# Patient Record
Sex: MALE | Race: WHITE | NOT HISPANIC OR LATINO | URBAN - METROPOLITAN AREA
[De-identification: names, ages, dates, MRNs, and addresses within clinical notes are randomized per-mention and may not be internally consistent; named-entity substitution may affect disease eponyms.]

---

## 2021-01-23 ENCOUNTER — INPATIENT (INPATIENT)
Facility: HOSPITAL | Age: 66
LOS: 2 days | Discharge: NURSING, REHAB OR LONG-TERM CARE | DRG: 191 | End: 2021-01-26
Attending: INTERNAL MEDICINE | Admitting: INTERNAL MEDICINE
Payer: MEDICAID

## 2021-01-23 VITALS
RESPIRATION RATE: 16 BRPM | WEIGHT: 203.93 LBS | HEART RATE: 80 BPM | HEIGHT: 72 IN | SYSTOLIC BLOOD PRESSURE: 74 MMHG | TEMPERATURE: 94 F | DIASTOLIC BLOOD PRESSURE: 45 MMHG | OXYGEN SATURATION: 90 %

## 2021-01-23 LAB
ALBUMIN SERPL ELPH-MCNC: 3.2 G/DL — LOW (ref 3.5–5)
ALP SERPL-CCNC: 87 U/L — SIGNIFICANT CHANGE UP (ref 40–120)
ALT FLD-CCNC: 21 U/L DA — SIGNIFICANT CHANGE UP (ref 10–60)
ANION GAP SERPL CALC-SCNC: 8 MMOL/L — SIGNIFICANT CHANGE UP (ref 5–17)
AST SERPL-CCNC: 14 U/L — SIGNIFICANT CHANGE UP (ref 10–40)
BASOPHILS # BLD AUTO: 0.02 K/UL — SIGNIFICANT CHANGE UP (ref 0–0.2)
BASOPHILS NFR BLD AUTO: 0.2 % — SIGNIFICANT CHANGE UP (ref 0–2)
BILIRUB SERPL-MCNC: 0.5 MG/DL — SIGNIFICANT CHANGE UP (ref 0.2–1.2)
BUN SERPL-MCNC: 15 MG/DL — SIGNIFICANT CHANGE UP (ref 7–18)
CALCIUM SERPL-MCNC: 8.1 MG/DL — LOW (ref 8.4–10.5)
CHLORIDE SERPL-SCNC: 102 MMOL/L — SIGNIFICANT CHANGE UP (ref 96–108)
CO2 SERPL-SCNC: 27 MMOL/L — SIGNIFICANT CHANGE UP (ref 22–31)
CREAT SERPL-MCNC: 1.75 MG/DL — HIGH (ref 0.5–1.3)
EOSINOPHIL # BLD AUTO: 0.01 K/UL — SIGNIFICANT CHANGE UP (ref 0–0.5)
EOSINOPHIL NFR BLD AUTO: 0.1 % — SIGNIFICANT CHANGE UP (ref 0–6)
GLUCOSE SERPL-MCNC: 132 MG/DL — HIGH (ref 70–99)
HCT VFR BLD CALC: 40 % — SIGNIFICANT CHANGE UP (ref 39–50)
HGB BLD-MCNC: 12.9 G/DL — LOW (ref 13–17)
IMM GRANULOCYTES NFR BLD AUTO: 0.9 % — SIGNIFICANT CHANGE UP (ref 0–1.5)
LYMPHOCYTES # BLD AUTO: 1.03 K/UL — SIGNIFICANT CHANGE UP (ref 1–3.3)
LYMPHOCYTES # BLD AUTO: 12.7 % — LOW (ref 13–44)
MCHC RBC-ENTMCNC: 32.3 GM/DL — SIGNIFICANT CHANGE UP (ref 32–36)
MCHC RBC-ENTMCNC: 33.9 PG — SIGNIFICANT CHANGE UP (ref 27–34)
MCV RBC AUTO: 105.3 FL — HIGH (ref 80–100)
MONOCYTES # BLD AUTO: 0.87 K/UL — SIGNIFICANT CHANGE UP (ref 0–0.9)
MONOCYTES NFR BLD AUTO: 10.7 % — SIGNIFICANT CHANGE UP (ref 2–14)
NEUTROPHILS # BLD AUTO: 6.11 K/UL — SIGNIFICANT CHANGE UP (ref 1.8–7.4)
NEUTROPHILS NFR BLD AUTO: 75.4 % — SIGNIFICANT CHANGE UP (ref 43–77)
NRBC # BLD: 0 /100 WBCS — SIGNIFICANT CHANGE UP (ref 0–0)
PLATELET # BLD AUTO: 262 K/UL — SIGNIFICANT CHANGE UP (ref 150–400)
POTASSIUM SERPL-MCNC: 3.7 MMOL/L — SIGNIFICANT CHANGE UP (ref 3.5–5.3)
POTASSIUM SERPL-SCNC: 3.7 MMOL/L — SIGNIFICANT CHANGE UP (ref 3.5–5.3)
PROT SERPL-MCNC: 6.8 G/DL — SIGNIFICANT CHANGE UP (ref 6–8.3)
RBC # BLD: 3.8 M/UL — LOW (ref 4.2–5.8)
RBC # FLD: 13.3 % — SIGNIFICANT CHANGE UP (ref 10.3–14.5)
SARS-COV-2 RNA SPEC QL NAA+PROBE: SIGNIFICANT CHANGE UP
SODIUM SERPL-SCNC: 137 MMOL/L — SIGNIFICANT CHANGE UP (ref 135–145)
TROPONIN I SERPL-MCNC: <0.015 NG/ML — SIGNIFICANT CHANGE UP (ref 0–0.04)
WBC # BLD: 8.11 K/UL — SIGNIFICANT CHANGE UP (ref 3.8–10.5)
WBC # FLD AUTO: 8.11 K/UL — SIGNIFICANT CHANGE UP (ref 3.8–10.5)

## 2021-01-23 PROCEDURE — 71045 X-RAY EXAM CHEST 1 VIEW: CPT | Mod: 26

## 2021-01-23 RX ORDER — SODIUM CHLORIDE 9 MG/ML
1000 INJECTION INTRAMUSCULAR; INTRAVENOUS; SUBCUTANEOUS ONCE
Refills: 0 | Status: COMPLETED | OUTPATIENT
Start: 2021-01-23 | End: 2021-01-23

## 2021-01-23 RX ORDER — MAGNESIUM SULFATE 500 MG/ML
1 VIAL (ML) INJECTION ONCE
Refills: 0 | Status: COMPLETED | OUTPATIENT
Start: 2021-01-23 | End: 2021-01-23

## 2021-01-23 RX ORDER — IPRATROPIUM/ALBUTEROL SULFATE 18-103MCG
3 AEROSOL WITH ADAPTER (GRAM) INHALATION ONCE
Refills: 0 | Status: COMPLETED | OUTPATIENT
Start: 2021-01-23 | End: 2021-01-23

## 2021-01-23 RX ADMIN — Medication 3 MILLILITER(S): at 23:19

## 2021-01-23 RX ADMIN — Medication 3 MILLILITER(S): at 23:29

## 2021-01-23 RX ADMIN — Medication 125 MILLIGRAM(S): at 23:10

## 2021-01-23 RX ADMIN — SODIUM CHLORIDE 1000 MILLILITER(S): 9 INJECTION INTRAMUSCULAR; INTRAVENOUS; SUBCUTANEOUS at 23:11

## 2021-01-23 RX ADMIN — Medication 100 GRAM(S): at 23:10

## 2021-01-23 NOTE — ED PROVIDER NOTE - OBJECTIVE STATEMENT
64 y/o male with PMHx of COPD, DM, BPH, hypothyroid. anxiety, and depression presents with complaint of shortness of breath. As per patient he got the corona virus vaccine 2 day ago and has been feeling short of breath with subjective fever since.  Symptoms worsened today.  As per patient he was going to have a cigarette when his symptoms worsened.  pt thought he was going to pass out.  In ED patient found to be hypotensive to 74/45, temperature of 94 and sat of 90%.  pt speaking 4-5 word sentences.

## 2021-01-23 NOTE — ED PROVIDER NOTE - PMH
Anxiety    Calculus of Kidney    Depression    Tinea Versicolor    UTI (Urinary Tract Infection)

## 2021-01-23 NOTE — ED PROVIDER NOTE - CLINICAL SUMMARY MEDICAL DECISION MAKING FREE TEXT BOX
pt with history of COPD Presents with shortness of breath.  + wheezing on exam.  Pt s/p covid vaccine 2 days ago.  Will check labs, nebs, mag sulfate, steroids, and reassess.

## 2021-01-23 NOTE — ED ADULT TRIAGE NOTE - CHIEF COMPLAINT QUOTE
BIBA states he feels sick since getting covid vaccine 2 days ago, pt was covid+ last april, pt hypotensive, hypothermic and 90% O2sat, h/o COPD

## 2021-01-24 DIAGNOSIS — F17.200 NICOTINE DEPENDENCE, UNSPECIFIED, UNCOMPLICATED: ICD-10-CM

## 2021-01-24 DIAGNOSIS — R52 PAIN, UNSPECIFIED: ICD-10-CM

## 2021-01-24 DIAGNOSIS — R21 RASH AND OTHER NONSPECIFIC SKIN ERUPTION: ICD-10-CM

## 2021-01-24 DIAGNOSIS — Z29.9 ENCOUNTER FOR PROPHYLACTIC MEASURES, UNSPECIFIED: ICD-10-CM

## 2021-01-24 DIAGNOSIS — J44.1 CHRONIC OBSTRUCTIVE PULMONARY DISEASE WITH (ACUTE) EXACERBATION: ICD-10-CM

## 2021-01-24 DIAGNOSIS — R55 SYNCOPE AND COLLAPSE: ICD-10-CM

## 2021-01-24 DIAGNOSIS — N40.0 BENIGN PROSTATIC HYPERPLASIA WITHOUT LOWER URINARY TRACT SYMPTOMS: ICD-10-CM

## 2021-01-24 DIAGNOSIS — F41.9 ANXIETY DISORDER, UNSPECIFIED: ICD-10-CM

## 2021-01-24 DIAGNOSIS — N17.9 ACUTE KIDNEY FAILURE, UNSPECIFIED: ICD-10-CM

## 2021-01-24 LAB
24R-OH-CALCIDIOL SERPL-MCNC: 26.1 NG/ML — LOW (ref 30–80)
A1C WITH ESTIMATED AVERAGE GLUCOSE RESULT: 6 % — HIGH (ref 4–5.6)
ANION GAP SERPL CALC-SCNC: 10 MMOL/L — SIGNIFICANT CHANGE UP (ref 5–17)
APPEARANCE UR: CLEAR — SIGNIFICANT CHANGE UP
BACTERIA # UR AUTO: ABNORMAL /HPF
BASOPHILS # BLD AUTO: 0 K/UL — SIGNIFICANT CHANGE UP (ref 0–0.2)
BASOPHILS NFR BLD AUTO: 0 % — SIGNIFICANT CHANGE UP (ref 0–2)
BILIRUB UR-MCNC: NEGATIVE — SIGNIFICANT CHANGE UP
BUN SERPL-MCNC: 18 MG/DL — SIGNIFICANT CHANGE UP (ref 7–18)
BURR CELLS BLD QL SMEAR: PRESENT — SIGNIFICANT CHANGE UP
BURR CELLS BLD QL SMEAR: SLIGHT — SIGNIFICANT CHANGE UP
CALCIUM SERPL-MCNC: 8.4 MG/DL — SIGNIFICANT CHANGE UP (ref 8.4–10.5)
CHLORIDE SERPL-SCNC: 104 MMOL/L — SIGNIFICANT CHANGE UP (ref 96–108)
CHOLEST SERPL-MCNC: 180 MG/DL — SIGNIFICANT CHANGE UP
CO2 SERPL-SCNC: 24 MMOL/L — SIGNIFICANT CHANGE UP (ref 22–31)
COLOR SPEC: YELLOW — SIGNIFICANT CHANGE UP
COMMENT - URINE: SIGNIFICANT CHANGE UP
CREAT ?TM UR-MCNC: 157 MG/DL — SIGNIFICANT CHANGE UP
CREAT SERPL-MCNC: 1.83 MG/DL — HIGH (ref 0.5–1.3)
DIFF PNL FLD: ABNORMAL
EOSINOPHIL # BLD AUTO: 0.19 K/UL — SIGNIFICANT CHANGE UP (ref 0–0.5)
EOSINOPHIL NFR BLD AUTO: 3.2 % — SIGNIFICANT CHANGE UP (ref 0–6)
EPI CELLS # UR: SIGNIFICANT CHANGE UP /HPF
ESTIMATED AVERAGE GLUCOSE: 126 MG/DL — HIGH (ref 68–114)
FERRITIN SERPL-MCNC: 154 NG/ML — SIGNIFICANT CHANGE UP (ref 30–400)
FOLATE SERPL-MCNC: >20 NG/ML — SIGNIFICANT CHANGE UP
GLUCOSE SERPL-MCNC: 231 MG/DL — HIGH (ref 70–99)
GLUCOSE UR QL: 250
HCT VFR BLD CALC: 40.8 % — SIGNIFICANT CHANGE UP (ref 39–50)
HCYS SERPL-MCNC: 9.6 UMOL/L — SIGNIFICANT CHANGE UP
HDLC SERPL-MCNC: 65 MG/DL — SIGNIFICANT CHANGE UP
HGB BLD-MCNC: 13.3 G/DL — SIGNIFICANT CHANGE UP (ref 13–17)
IMM GRANULOCYTES NFR BLD AUTO: 0.8 % — SIGNIFICANT CHANGE UP (ref 0–1.5)
IRON SATN MFR SERPL: 21 UG/DL — LOW (ref 65–170)
IRON SATN MFR SERPL: 7 % — LOW (ref 20–55)
KETONES UR-MCNC: NEGATIVE — SIGNIFICANT CHANGE UP
LDH SERPL L TO P-CCNC: 184 U/L — SIGNIFICANT CHANGE UP (ref 120–225)
LEUKOCYTE ESTERASE UR-ACNC: NEGATIVE — SIGNIFICANT CHANGE UP
LG PLATELETS BLD QL AUTO: SLIGHT — SIGNIFICANT CHANGE UP
LIPID PNL WITH DIRECT LDL SERPL: 93 MG/DL — SIGNIFICANT CHANGE UP
LYMPHOCYTES # BLD AUTO: 0.46 K/UL — LOW (ref 1–3.3)
LYMPHOCYTES # BLD AUTO: 7.7 % — LOW (ref 13–44)
MACROCYTES BLD QL: SLIGHT — SIGNIFICANT CHANGE UP
MAGNESIUM SERPL-MCNC: 2.2 MG/DL — SIGNIFICANT CHANGE UP (ref 1.6–2.6)
MANUAL SMEAR VERIFICATION: SIGNIFICANT CHANGE UP
MCHC RBC-ENTMCNC: 32.6 GM/DL — SIGNIFICANT CHANGE UP (ref 32–36)
MCHC RBC-ENTMCNC: 34.4 PG — HIGH (ref 27–34)
MCV RBC AUTO: 105.4 FL — HIGH (ref 80–100)
MONOCYTES # BLD AUTO: 0.05 K/UL — SIGNIFICANT CHANGE UP (ref 0–0.9)
MONOCYTES NFR BLD AUTO: 0.8 % — LOW (ref 2–14)
NEUTROPHILS # BLD AUTO: 5.23 K/UL — SIGNIFICANT CHANGE UP (ref 1.8–7.4)
NEUTROPHILS NFR BLD AUTO: 87.5 % — HIGH (ref 43–77)
NITRITE UR-MCNC: NEGATIVE — SIGNIFICANT CHANGE UP
NON HDL CHOLESTEROL: 115 MG/DL — SIGNIFICANT CHANGE UP
NRBC # BLD: 0 /100 WBCS — SIGNIFICANT CHANGE UP (ref 0–0)
OVALOCYTES BLD QL SMEAR: SLIGHT — SIGNIFICANT CHANGE UP
PH UR: 6 — SIGNIFICANT CHANGE UP (ref 5–8)
PHOSPHATE SERPL-MCNC: 2.6 MG/DL — SIGNIFICANT CHANGE UP (ref 2.5–4.5)
PLAT MORPH BLD: NORMAL — SIGNIFICANT CHANGE UP
PLATELET # BLD AUTO: 284 K/UL — SIGNIFICANT CHANGE UP (ref 150–400)
PLATELET COUNT - ESTIMATE: NORMAL — SIGNIFICANT CHANGE UP
POIKILOCYTOSIS BLD QL AUTO: SLIGHT — SIGNIFICANT CHANGE UP
POTASSIUM SERPL-MCNC: 4.3 MMOL/L — SIGNIFICANT CHANGE UP (ref 3.5–5.3)
POTASSIUM SERPL-SCNC: 4.3 MMOL/L — SIGNIFICANT CHANGE UP (ref 3.5–5.3)
PROT ?TM UR-MCNC: 26 MG/DL — HIGH (ref 0–12)
PROT SERPL-MCNC: 6.6 G/DL — SIGNIFICANT CHANGE UP (ref 6–8.3)
PROT SERPL-MCNC: 6.6 G/DL — SIGNIFICANT CHANGE UP (ref 6–8.3)
PROT UR-MCNC: 15
RBC # BLD: 3.87 M/UL — LOW (ref 4.2–5.8)
RBC # BLD: 3.87 M/UL — LOW (ref 4.2–5.8)
RBC # FLD: 13.6 % — SIGNIFICANT CHANGE UP (ref 10.3–14.5)
RBC BLD AUTO: ABNORMAL
RBC CASTS # UR COMP ASSIST: ABNORMAL /HPF (ref 0–2)
RETICS #: 84 K/UL — SIGNIFICANT CHANGE UP (ref 25–125)
RETICS/RBC NFR: 2.2 % — SIGNIFICANT CHANGE UP (ref 0.5–2.5)
SARS-COV-2 IGG SERPL QL IA: POSITIVE
SARS-COV-2 IGM SERPL IA-ACNC: 183 INDEX — HIGH
SODIUM SERPL-SCNC: 138 MMOL/L — SIGNIFICANT CHANGE UP (ref 135–145)
SODIUM UR-SCNC: 56 MMOL/L — SIGNIFICANT CHANGE UP
SP GR SPEC: 1.02 — SIGNIFICANT CHANGE UP (ref 1.01–1.02)
TIBC SERPL-MCNC: 291 UG/DL — SIGNIFICANT CHANGE UP (ref 250–450)
TRIGL SERPL-MCNC: 110 MG/DL — SIGNIFICANT CHANGE UP
TROPONIN I SERPL-MCNC: <0.015 NG/ML — SIGNIFICANT CHANGE UP (ref 0–0.04)
TSH SERPL-MCNC: 0.35 UU/ML — SIGNIFICANT CHANGE UP (ref 0.34–4.82)
UIBC SERPL-MCNC: 270 UG/DL — SIGNIFICANT CHANGE UP (ref 110–370)
UROBILINOGEN FLD QL: NEGATIVE — SIGNIFICANT CHANGE UP
VIT B12 SERPL-MCNC: 375 PG/ML — SIGNIFICANT CHANGE UP (ref 232–1245)
WBC # BLD: 5.98 K/UL — SIGNIFICANT CHANGE UP (ref 3.8–10.5)
WBC # FLD AUTO: 5.98 K/UL — SIGNIFICANT CHANGE UP (ref 3.8–10.5)
WBC UR QL: SIGNIFICANT CHANGE UP /HPF (ref 0–5)

## 2021-01-24 PROCEDURE — 70450 CT HEAD/BRAIN W/O DYE: CPT | Mod: 26

## 2021-01-24 PROCEDURE — 93010 ELECTROCARDIOGRAM REPORT: CPT

## 2021-01-24 PROCEDURE — 99285 EMERGENCY DEPT VISIT HI MDM: CPT

## 2021-01-24 PROCEDURE — 93880 EXTRACRANIAL BILAT STUDY: CPT | Mod: 26

## 2021-01-24 RX ORDER — OXYCODONE HYDROCHLORIDE 5 MG/1
15 TABLET ORAL THREE TIMES A DAY
Refills: 0 | Status: DISCONTINUED | OUTPATIENT
Start: 2021-01-24 | End: 2021-01-25

## 2021-01-24 RX ORDER — SODIUM CHLORIDE 9 MG/ML
1000 INJECTION INTRAMUSCULAR; INTRAVENOUS; SUBCUTANEOUS
Refills: 0 | Status: DISCONTINUED | OUTPATIENT
Start: 2021-01-24 | End: 2021-01-26

## 2021-01-24 RX ORDER — CHOLECALCIFEROL (VITAMIN D3) 125 MCG
1000 CAPSULE ORAL DAILY
Refills: 0 | Status: DISCONTINUED | OUTPATIENT
Start: 2021-01-24 | End: 2021-01-26

## 2021-01-24 RX ORDER — LIDOCAINE 4 G/100G
15 CREAM TOPICAL
Refills: 0 | Status: DISCONTINUED | OUTPATIENT
Start: 2021-01-24 | End: 2021-01-26

## 2021-01-24 RX ORDER — ALBUTEROL 90 UG/1
2 AEROSOL, METERED ORAL EVERY 6 HOURS
Refills: 0 | Status: DISCONTINUED | OUTPATIENT
Start: 2021-01-24 | End: 2021-01-26

## 2021-01-24 RX ORDER — OXCARBAZEPINE 300 MG/1
300 TABLET, FILM COATED ORAL
Refills: 0 | Status: DISCONTINUED | OUTPATIENT
Start: 2021-01-24 | End: 2021-01-26

## 2021-01-24 RX ORDER — FOLIC ACID 0.8 MG
1 TABLET ORAL DAILY
Refills: 0 | Status: DISCONTINUED | OUTPATIENT
Start: 2021-01-24 | End: 2021-01-26

## 2021-01-24 RX ORDER — HEPARIN SODIUM 5000 [USP'U]/ML
5000 INJECTION INTRAVENOUS; SUBCUTANEOUS EVERY 8 HOURS
Refills: 0 | Status: DISCONTINUED | OUTPATIENT
Start: 2021-01-24 | End: 2021-01-26

## 2021-01-24 RX ORDER — GABAPENTIN 400 MG/1
600 CAPSULE ORAL THREE TIMES A DAY
Refills: 0 | Status: DISCONTINUED | OUTPATIENT
Start: 2021-01-24 | End: 2021-01-26

## 2021-01-24 RX ORDER — PANTOPRAZOLE SODIUM 20 MG/1
40 TABLET, DELAYED RELEASE ORAL
Refills: 0 | Status: DISCONTINUED | OUTPATIENT
Start: 2021-01-24 | End: 2021-01-26

## 2021-01-24 RX ORDER — BUDESONIDE AND FORMOTEROL FUMARATE DIHYDRATE 160; 4.5 UG/1; UG/1
2 AEROSOL RESPIRATORY (INHALATION)
Refills: 0 | Status: DISCONTINUED | OUTPATIENT
Start: 2021-01-24 | End: 2021-01-26

## 2021-01-24 RX ORDER — TAMSULOSIN HYDROCHLORIDE 0.4 MG/1
0.4 CAPSULE ORAL AT BEDTIME
Refills: 0 | Status: DISCONTINUED | OUTPATIENT
Start: 2021-01-24 | End: 2021-01-26

## 2021-01-24 RX ORDER — QUETIAPINE FUMARATE 200 MG/1
100 TABLET, FILM COATED ORAL AT BEDTIME
Refills: 0 | Status: DISCONTINUED | OUTPATIENT
Start: 2021-01-24 | End: 2021-01-26

## 2021-01-24 RX ORDER — DULOXETINE HYDROCHLORIDE 30 MG/1
30 CAPSULE, DELAYED RELEASE ORAL
Refills: 0 | Status: DISCONTINUED | OUTPATIENT
Start: 2021-01-24 | End: 2021-01-26

## 2021-01-24 RX ORDER — SODIUM CHLORIDE 9 MG/ML
1000 INJECTION INTRAMUSCULAR; INTRAVENOUS; SUBCUTANEOUS ONCE
Refills: 0 | Status: COMPLETED | OUTPATIENT
Start: 2021-01-24 | End: 2021-01-24

## 2021-01-24 RX ORDER — ASPIRIN/CALCIUM CARB/MAGNESIUM 324 MG
81 TABLET ORAL DAILY
Refills: 0 | Status: DISCONTINUED | OUTPATIENT
Start: 2021-01-24 | End: 2021-01-26

## 2021-01-24 RX ORDER — LEVOTHYROXINE SODIUM 125 MCG
25 TABLET ORAL
Refills: 0 | Status: DISCONTINUED | OUTPATIENT
Start: 2021-01-24 | End: 2021-01-26

## 2021-01-24 RX ORDER — OXYCODONE HYDROCHLORIDE 5 MG/1
10 TABLET ORAL ONCE
Refills: 0 | Status: DISCONTINUED | OUTPATIENT
Start: 2021-01-24 | End: 2021-01-24

## 2021-01-24 RX ORDER — ATORVASTATIN CALCIUM 80 MG/1
20 TABLET, FILM COATED ORAL AT BEDTIME
Refills: 0 | Status: DISCONTINUED | OUTPATIENT
Start: 2021-01-24 | End: 2021-01-26

## 2021-01-24 RX ORDER — OXYCODONE HYDROCHLORIDE 5 MG/1
5 TABLET ORAL THREE TIMES A DAY
Refills: 0 | Status: DISCONTINUED | OUTPATIENT
Start: 2021-01-24 | End: 2021-01-24

## 2021-01-24 RX ORDER — NICOTINE POLACRILEX 2 MG
1 GUM BUCCAL DAILY
Refills: 0 | Status: DISCONTINUED | OUTPATIENT
Start: 2021-01-24 | End: 2021-01-26

## 2021-01-24 RX ADMIN — ATORVASTATIN CALCIUM 20 MILLIGRAM(S): 80 TABLET, FILM COATED ORAL at 21:41

## 2021-01-24 RX ADMIN — OXYCODONE HYDROCHLORIDE 5 MILLIGRAM(S): 5 TABLET ORAL at 13:42

## 2021-01-24 RX ADMIN — Medication 40 MILLIGRAM(S): at 05:36

## 2021-01-24 RX ADMIN — GABAPENTIN 600 MILLIGRAM(S): 400 CAPSULE ORAL at 05:37

## 2021-01-24 RX ADMIN — Medication 1 PATCH: at 19:52

## 2021-01-24 RX ADMIN — SODIUM CHLORIDE 50 MILLILITER(S): 9 INJECTION INTRAMUSCULAR; INTRAVENOUS; SUBCUTANEOUS at 14:36

## 2021-01-24 RX ADMIN — Medication 25 MICROGRAM(S): at 05:37

## 2021-01-24 RX ADMIN — Medication 40 MILLIGRAM(S): at 17:35

## 2021-01-24 RX ADMIN — Medication 1 APPLICATION(S): at 05:39

## 2021-01-24 RX ADMIN — HEPARIN SODIUM 5000 UNIT(S): 5000 INJECTION INTRAVENOUS; SUBCUTANEOUS at 21:41

## 2021-01-24 RX ADMIN — GABAPENTIN 600 MILLIGRAM(S): 400 CAPSULE ORAL at 21:41

## 2021-01-24 RX ADMIN — PANTOPRAZOLE SODIUM 40 MILLIGRAM(S): 20 TABLET, DELAYED RELEASE ORAL at 05:37

## 2021-01-24 RX ADMIN — HEPARIN SODIUM 5000 UNIT(S): 5000 INJECTION INTRAVENOUS; SUBCUTANEOUS at 05:36

## 2021-01-24 RX ADMIN — OXYCODONE HYDROCHLORIDE 5 MILLIGRAM(S): 5 TABLET ORAL at 05:37

## 2021-01-24 RX ADMIN — BUDESONIDE AND FORMOTEROL FUMARATE DIHYDRATE 2 PUFF(S): 160; 4.5 AEROSOL RESPIRATORY (INHALATION) at 12:11

## 2021-01-24 RX ADMIN — Medication 1 PATCH: at 12:10

## 2021-01-24 RX ADMIN — HEPARIN SODIUM 5000 UNIT(S): 5000 INJECTION INTRAVENOUS; SUBCUTANEOUS at 13:42

## 2021-01-24 RX ADMIN — DULOXETINE HYDROCHLORIDE 30 MILLIGRAM(S): 30 CAPSULE, DELAYED RELEASE ORAL at 05:37

## 2021-01-24 RX ADMIN — OXCARBAZEPINE 300 MILLIGRAM(S): 300 TABLET, FILM COATED ORAL at 17:31

## 2021-01-24 RX ADMIN — QUETIAPINE FUMARATE 100 MILLIGRAM(S): 200 TABLET, FILM COATED ORAL at 21:41

## 2021-01-24 RX ADMIN — BUDESONIDE AND FORMOTEROL FUMARATE DIHYDRATE 2 PUFF(S): 160; 4.5 AEROSOL RESPIRATORY (INHALATION) at 22:59

## 2021-01-24 RX ADMIN — Medication 1 MILLIGRAM(S): at 12:10

## 2021-01-24 RX ADMIN — OXYCODONE HYDROCHLORIDE 15 MILLIGRAM(S): 5 TABLET ORAL at 21:47

## 2021-01-24 RX ADMIN — TAMSULOSIN HYDROCHLORIDE 0.4 MILLIGRAM(S): 0.4 CAPSULE ORAL at 21:41

## 2021-01-24 RX ADMIN — DULOXETINE HYDROCHLORIDE 30 MILLIGRAM(S): 30 CAPSULE, DELAYED RELEASE ORAL at 17:31

## 2021-01-24 RX ADMIN — Medication 81 MILLIGRAM(S): at 12:10

## 2021-01-24 RX ADMIN — OXCARBAZEPINE 300 MILLIGRAM(S): 300 TABLET, FILM COATED ORAL at 05:37

## 2021-01-24 RX ADMIN — Medication 1 GRAM(S): at 00:23

## 2021-01-24 RX ADMIN — OXYCODONE HYDROCHLORIDE 10 MILLIGRAM(S): 5 TABLET ORAL at 14:34

## 2021-01-24 RX ADMIN — GABAPENTIN 600 MILLIGRAM(S): 400 CAPSULE ORAL at 13:41

## 2021-01-24 RX ADMIN — SODIUM CHLORIDE 1000 MILLILITER(S): 9 INJECTION INTRAMUSCULAR; INTRAVENOUS; SUBCUTANEOUS at 00:19

## 2021-01-24 RX ADMIN — Medication 1 APPLICATION(S): at 17:32

## 2021-01-24 NOTE — H&P ADULT - ASSESSMENT
64 y/o male from [facility], walks with walker, with PMHx of COPD, orthostatic hypotension, BPH, hypothyroid. anxiety, and depression, PSH of left hip pin fixation presents with complaint of weakness and shortness of breath. Pt was admitted for COPD exacerbation and syncope work up.       In the ED, patient  was found to be hypotensive to 74/45, temperature of 94 and sat of 90%.  pt speaking 4-5 word sentences.  s/p duoneb, solu-medrol 125mg    GOC: FULL CODE

## 2021-01-24 NOTE — H&P ADULT - PROBLEM SELECTOR PLAN 2
p/w SOB, spO2 was 90%  s/p Solu-medrol and duonebs  Currently on RA, no respiratory distress, however wheezing all over the lungs  c/w Solu-medrol 40mg IV BID  c/w inhalers  monitor respiratory status

## 2021-01-24 NOTE — CONSULT NOTE ADULT - SUBJECTIVE AND OBJECTIVE BOX
PULMONARY CONSULT NOTE      MARNIE FRY  MRN-[id number]    Patient is a 65y old  Male who presents with a chief complaint of Syncope and COPD exacerbation (2021 03:13)    History of Present Illness:  Reason for Admission: Syncope and COPD exacerbation  History of Present Illness:   64 y/o male from [facility], walks with walker, with PMHx of COPD, orthostatic hypotension, BPH, hypothyroid. anxiety, and depression, PSH of left hip pin fixation presents with complaint of weakness and shortness of breath. As per patient he had the covid infection previously, and had covid vaccine on Fri (2 days ago) and has been feeling short of breath and sick since.  As per patient,  he was walking with a walker, suddenly he felt dizzy and weak and laid down on the floor. He thinks he lost consciousness at some point. He heard that people was trying to wake him up, but he was too weak to respond. As per pt, he always have orthostatic hypotension, usually his SBP is 120s, and when he stands up, it drops to 80s.           HISTORY OF PRESENT ILLNESS: As above. Awake, alert, laying in bed in NAD    MEDICATIONS  (STANDING):  aspirin  chewable 81 milliGRAM(s) Oral daily  atorvastatin 20 milliGRAM(s) Oral at bedtime  budesonide 160 MICROgram(s)/formoterol 4.5 MICROgram(s) Inhaler 2 Puff(s) Inhalation two times a day  DULoxetine 30 milliGRAM(s) Oral two times a day  folic acid 1 milliGRAM(s) Oral daily  gabapentin 600 milliGRAM(s) Oral three times a day  heparin   Injectable 5000 Unit(s) SubCutaneous every 8 hours  levothyroxine 25 MICROGram(s) Oral <User Schedule>  methylPREDNISolone sodium succinate Injectable 40 milliGRAM(s) IV Push two times a day  nicotine -  14 mG/24Hr(s) Patch 1 patch Transdermal daily  OXcarbazepine 300 milliGRAM(s) Oral two times a day  oxyCODONE    IR 5 milliGRAM(s) Oral three times a day  pantoprazole    Tablet 40 milliGRAM(s) Oral before breakfast  QUEtiapine 100 milliGRAM(s) Oral at bedtime  sodium chloride 0.9%. 1000 milliLiter(s) (50 mL/Hr) IV Continuous <Continuous>  tamsulosin 0.4 milliGRAM(s) Oral at bedtime  triamcinolone 0.1% Ointment 1 Application(s) Topical two times a day      MEDICATIONS  (PRN):  ALBUTerol    90 MICROgram(s) HFA Inhaler 2 Puff(s) Inhalation every 6 hours PRN Shortness of Breath and/or Wheezing  lidocaine 2% Viscous 15 milliLiter(s) Swish and Spit every 3 hours PRN Mouth Care      Allergies    No Known Allergies    Intolerances        PAST MEDICAL & SURGICAL HISTORY:  Current smoker    UTI (Urinary Tract Infection)    Anxiety    Depression    Tinea Versicolor    Calculus of Kidney    Left Ureter Stent Placement  2011    percutaneous stone extraction - Left  2011    Rotator Cuff Tear Repair  right         FAMILY HISTORY:      SOCIAL HISTORY  Smoking History:     REVIEW OF SYSTEMS:    CONSTITUTIONAL:  No fevers, chills, sweats    HEENT:  Eyes:  No diplopia or blurred vision. ENT:  No earache, sore throat or runny nose.    CARDIOVASCULAR:  No pressure, squeezing, tightness, or heaviness about the chest; no palpitations.    RESPIRATORY:  Per HPI    GASTROINTESTINAL:  No abdominal pain, nausea, vomiting or diarrhea.    GENITOURINARY:  No dysuria, frequency or urgency.    NEUROLOGIC:  No paresthesias, fasciculations, seizures or weakness.    PSYCHIATRIC:  No disorder of thought or mood.    Vital Signs Last 24 Hrs  T(C): 36.4 (2021 11:15), Max: 37.2 (2021 05:42)  T(F): 97.5 (2021 11:15), Max: 98.9 (2021 05:42)  HR: 81 (2021 11:15) (69 - 81)  BP: 131/75 (2021 11:15) (74/45 - 138/79)  BP(mean): --  RR: 18 (2021 11:15) (16 - 18)  SpO2: 96% (2021 11:15) (90% - 98%)  I&O's Detail      PHYSICAL EXAMINATION:    GENERAL: The patient is a well-developed, well-nourished _____in no apparent distress.     HEENT: Head is normocephalic and atraumatic. Extraocular muscles are intact. Mucous membranes are moist.     NECK: Supple.     LUNGS: wheezes bilaterally    HEART: Regular rate and rhythm without murmur.    ABDOMEN: Soft, nontender, and nondistended.  No hepatosplenomegaly is noted.    EXTREMITIES: Without any cyanosis, clubbing, rash, lesions or edema.    NEUROLOGIC: Grossly intact.      LABS:                        13.3   5.98  )-----------( 284      ( 2021 06:10 )             40.8         138  |  104  |  18  ----------------------------<  231<H>  4.3   |  24  |  1.83<H>    Ca    8.4      2021 06:10  Phos  2.6       Mg     2.2         TPro  6.8  /  Alb  3.2<L>  /  TBili  0.5  /  DBili  x   /  AST  14  /  ALT  21  /  AlkPhos  87        Urinalysis Basic - ( 2021 06:59 )    Color: Yellow / Appearance: Clear / S.020 / pH: x  Gluc: x / Ketone: Negative  / Bili: Negative / Urobili: Negative   Blood: x / Protein: 15 / Nitrite: Negative   Leuk Esterase: Negative / RBC: 5-10 /HPF / WBC 0-2 /HPF   Sq Epi: x / Non Sq Epi: Few /HPF / Bacteria: Trace /HPF        CARDIAC MARKERS ( 2021 06:10 )  <0.015 ng/mL / x     / x     / x     / x      CARDIAC MARKERS ( 2021 23:09 )  <0.015 ng/mL / x     / x     / x     / x                    MICROBIOLOGY:    RADIOLOGY & ADDITIONAL STUDIES:    CXR:    < from: Xray Chest 1 View- PORTABLE-Urgent (Xray Chest 1 View- PORTABLE-Urgent .) (21 @ 23:03) >  Clear lungs. Nopleural effusion or pneumothorax.    < end of copied text >  Ct scan chest:    < from: CT Head No Cont (21 @ 08:35) >  IMPRESSION:    No acute intracranial findings.        < end of copied text >  ekg;    echo:

## 2021-01-24 NOTE — H&P ADULT - PROBLEM SELECTOR PLAN 5
Pt had lower back pain and had lidocaine patch in the AL, skin became red and skin breaks after lidocaine patch removal  c/w steroid topical and skin dressing

## 2021-01-24 NOTE — H&P ADULT - NSICDXPASTSURGICALHX_GEN_ALL_CORE_FT
PAST SURGICAL HISTORY:  Left Ureter Stent Placement 6/2011    percutaneous stone extraction - Left 8/2011    Rotator Cuff Tear Repair right 2008

## 2021-01-24 NOTE — H&P ADULT - PROBLEM SELECTOR PLAN 9
IMPROVE VTE Individual Risk Assessment  RISK                                                                Points  [  ] Previous VTE                                                  3  [  ] Thrombophilia                                               2  [  ] Lower limb paralysis                                      2        (unable to hold up >15 seconds)    [  ] Current Cancer                                              2         (within 6 months)  [x ] Immobilization > 24 hrs                                1  [  ] ICU/CCU stay > 24 hours                              1  [x  ] Age > 60                                                      1  IMPROVE VTE Score ___2______  Heparin SC for DVT ppx

## 2021-01-24 NOTE — ED ADULT NURSE NOTE - NSIMPLEMENTINTERV_GEN_ALL_ED
Implemented All Fall Risk Interventions:  [place] to call system. Call bell, personal items and telephone within reach. Instruct patient to call for assistance. Room bathroom lighting operational. Non-slip footwear when patient is off stretcher. Physically safe environment: no spills, clutter or unnecessary equipment. Stretcher in lowest position, wheels locked, appropriate side rails in place. Provide visual cue, wrist band, yellow gown, etc. Monitor gait and stability. Monitor for mental status changes and reorient to person, place, and time. Review medications for side effects contributing to fall risk. Reinforce activity limits and safety measures with patient and family.

## 2021-01-24 NOTE — ED ADULT NURSE NOTE - OBJECTIVE STATEMENT
Pt BIBA after recently becoming sick since receiving the covid 19 vaccine 2 days ago.  Pt states he has been weak and lethargic, and that he has actually passed out.  Pt states he has experienced difficulty breathing amongst other things.

## 2021-01-24 NOTE — H&P ADULT - PROBLEM SELECTOR PLAN 1
- EKG: NSR   - Trops 1st set -ve   - f/u Trops 2nd set in AM  - Tele monitoring   - pt has hx of orthostatic hypotension  - f/u Orthostatics   - f/u carotid doppler   - f/u Echo  - f/u PT Eval

## 2021-01-24 NOTE — H&P ADULT - NSICDXPASTMEDICALHX_GEN_ALL_CORE_FT
PAST MEDICAL HISTORY:  Anxiety     Calculus of Kidney     Current smoker     Depression     Tinea Versicolor     UTI (Urinary Tract Infection)

## 2021-01-24 NOTE — H&P ADULT - EXTREMITIES
Component      Latest Ref Rng & Units 4/26/2019   HGB      12.0 - 15.5 g/dL 12.1   1HR [place]      65 - 139 mg/dL 113   COLOR      YELLOW YELLOW   CLARITY       CLEAR   GLUCOSE(URINE)      NEGATIVE mg/dL NEGATIVE   BILIRUBIN      NEGATIVE NEGATIVE   KETONES      NEGATIVE mg/dL NEGATIVE   SPECIFIC GRAVITY      1.005 - 1.030 1.015   BLOOD      NEGATIVE NEGATIVE   pH      5.0 - 7.0 Units 8.5 (H)   PROTEIN(URINE)      NEGATIVE mg/dL NEGATIVE   UROBILINOGEN      0.0 - 1.0 mg/dL 0.2   NITRITE      NEGATIVE NEGATIVE   LEUKOCYTE ESTERASE      NEGATIVE NEGATIVE   URINE TYPE       URINE, CLEAN CATCH/MIDSTREAM     Patient aware of her results   No further questions at this time   detailed exam

## 2021-01-24 NOTE — H&P ADULT - HISTORY OF PRESENT ILLNESS
66 y/o male with PMHx of COPD, DM, BPH, hypothyroid. anxiety, and depression presents with complaint of shortness of breath. As per patient he got the corona virus vaccine 2 day ago and has been feeling short of breath with subjective fever since.  Symptoms worsened today.  As per patient he was going to have a cigarette when his symptoms worsened.  pt thought he was going to pass out.  In ED patient found to be hypotensive to 74/45, temperature of 94 and sat of 90%.  pt speaking 4-5 word sentences. 66 y/o male from [facility] living with PMHx of COPD, orthostatic hypotension, BPH, hypothyroid. anxiety, and depression presents with complaint of weakness and shortness of breath. As per patient he got the corona virus previously, and had covid vaccine on Fri (2 days ago) and has been feeling short of breath and sick since.  As per patient,  he was walking with a walker, suddenly he felt weak and laid down on the floor. He endorsed he lost consciousness at some point. He heard that people was trying to wake him up, but he was too weak to respond. As per pt, he always have orthostatic hypotension, usually his SBP is 120s, and when he stands up, it drops to 80s.       In the ED, patient  was found to be hypotensive to 74/45, temperature of 94 and sat of 90%.  pt speaking 4-5 word sentences. 64 y/o male from [facility], walks with walker, with PMHx of COPD, orthostatic hypotension, BPH, hypothyroid. anxiety, and depression, PSH of left hip pin fixation presents with complaint of weakness and shortness of breath. As per patient he got the corona virus previously, and had covid vaccine on Fri (2 days ago) and has been feeling short of breath and sick since.  As per patient,  he was walking with a walker, suddenly he felt weak and laid down on the floor. He endorsed he lost consciousness at some point. He heard that people was trying to wake him up, but he was too weak to respond. As per pt, he always have orthostatic hypotension, usually his SBP is 120s, and when he stands up, it drops to 80s.       In the ED, patient  was found to be hypotensive to 74/45, temperature of 94 and sat of 90%.  pt speaking 4-5 word sentences.  s/p duoneb, solu-medrol 125mg    GOC: FULL CODE 64 y/o male from [facility], walks with walker, with PMHx of COPD, orthostatic hypotension, BPH, hypothyroid. anxiety, and depression, PSH of left hip pin fixation presents with complaint of weakness and shortness of breath. As per patient he had the covid infection previously, and had covid vaccine on Fri (2 days ago) and has been feeling short of breath and sick since.  As per patient,  he was walking with a walker, suddenly he felt dizzy and weak and laid down on the floor. He thinks he lost consciousness at some point. He heard that people was trying to wake him up, but he was too weak to respond. As per pt, he always have orthostatic hypotension, usually his SBP is 120s, and when he stands up, it drops to 80s.       In the ED, patient  was found to be hypotensive to 74/45, temperature of 94 and sat of 90%.  pt speaking 4-5 word sentences.  s/p duoneb, solu-medrol 125mg    GOC: FULL CODE

## 2021-01-24 NOTE — H&P ADULT - PROBLEM SELECTOR PLAN 3
- BUN/Cr 15/1.75   - baseline unknown  - could be  prerenal given poor PO intake   - c/w IVF   - f/u BMP   f/u urine study  f/u  FeNa

## 2021-01-25 DIAGNOSIS — E03.9 HYPOTHYROIDISM, UNSPECIFIED: ICD-10-CM

## 2021-01-25 LAB
% ALBUMIN: 58.7 % — SIGNIFICANT CHANGE UP
% ALPHA 1: 5 % — SIGNIFICANT CHANGE UP
% ALPHA 2: 11.1 % — SIGNIFICANT CHANGE UP
% BETA: 12.3 % — SIGNIFICANT CHANGE UP
% GAMMA: 12.9 % — SIGNIFICANT CHANGE UP
ACANTHOCYTES BLD QL SMEAR: SLIGHT — SIGNIFICANT CHANGE UP
ALBUMIN SERPL ELPH-MCNC: 3.9 G/DL — SIGNIFICANT CHANGE UP (ref 3.6–5.5)
ALBUMIN/GLOB SERPL ELPH: 1.4 RATIO — SIGNIFICANT CHANGE UP
ALPHA1 GLOB SERPL ELPH-MCNC: 0.3 G/DL — SIGNIFICANT CHANGE UP (ref 0.1–0.4)
ALPHA2 GLOB SERPL ELPH-MCNC: 0.7 G/DL — SIGNIFICANT CHANGE UP (ref 0.5–1)
ANION GAP SERPL CALC-SCNC: 9 MMOL/L — SIGNIFICANT CHANGE UP (ref 5–17)
ANISOCYTOSIS BLD QL: SLIGHT — SIGNIFICANT CHANGE UP
B-GLOBULIN SERPL ELPH-MCNC: 0.8 G/DL — SIGNIFICANT CHANGE UP (ref 0.5–1)
BASOPHILS # BLD AUTO: 0 K/UL — SIGNIFICANT CHANGE UP (ref 0–0.2)
BASOPHILS NFR BLD AUTO: 0 % — SIGNIFICANT CHANGE UP (ref 0–2)
BUN SERPL-MCNC: 22 MG/DL — HIGH (ref 7–18)
CALCIUM SERPL-MCNC: 8.6 MG/DL — SIGNIFICANT CHANGE UP (ref 8.4–10.5)
CHLORIDE SERPL-SCNC: 104 MMOL/L — SIGNIFICANT CHANGE UP (ref 96–108)
CO2 SERPL-SCNC: 26 MMOL/L — SIGNIFICANT CHANGE UP (ref 22–31)
CORTIS AM PEAK SERPL-MCNC: 3.9 UG/DL — LOW (ref 6–18.4)
CREAT SERPL-MCNC: 1.02 MG/DL — SIGNIFICANT CHANGE UP (ref 0.5–1.3)
EOSINOPHIL # BLD AUTO: 0 K/UL — SIGNIFICANT CHANGE UP (ref 0–0.5)
EOSINOPHIL NFR BLD AUTO: 0 % — SIGNIFICANT CHANGE UP (ref 0–6)
GAMMA GLOBULIN: 0.9 G/DL — SIGNIFICANT CHANGE UP (ref 0.6–1.6)
GLUCOSE SERPL-MCNC: 161 MG/DL — HIGH (ref 70–99)
HCT VFR BLD CALC: 38.5 % — LOW (ref 39–50)
HCV AB S/CO SERPL IA: 0.13 S/CO — SIGNIFICANT CHANGE UP (ref 0–0.99)
HCV AB SERPL-IMP: SIGNIFICANT CHANGE UP
HGB BLD-MCNC: 12.7 G/DL — LOW (ref 13–17)
LG PLATELETS BLD QL AUTO: SLIGHT — SIGNIFICANT CHANGE UP
LYMPHOCYTES # BLD AUTO: 0.91 K/UL — LOW (ref 1–3.3)
LYMPHOCYTES # BLD AUTO: 10 % — LOW (ref 13–44)
MACROCYTES BLD QL: SLIGHT — SIGNIFICANT CHANGE UP
MAGNESIUM SERPL-MCNC: 2.2 MG/DL — SIGNIFICANT CHANGE UP (ref 1.6–2.6)
MANUAL SMEAR VERIFICATION: SIGNIFICANT CHANGE UP
MCHC RBC-ENTMCNC: 33 GM/DL — SIGNIFICANT CHANGE UP (ref 32–36)
MCHC RBC-ENTMCNC: 34 PG — SIGNIFICANT CHANGE UP (ref 27–34)
MCV RBC AUTO: 102.9 FL — HIGH (ref 80–100)
MONOCYTES # BLD AUTO: 0.73 K/UL — SIGNIFICANT CHANGE UP (ref 0–0.9)
MONOCYTES NFR BLD AUTO: 8 % — SIGNIFICANT CHANGE UP (ref 2–14)
NEUTROPHILS # BLD AUTO: 7.01 K/UL — SIGNIFICANT CHANGE UP (ref 1.8–7.4)
NEUTROPHILS NFR BLD AUTO: 77 % — SIGNIFICANT CHANGE UP (ref 43–77)
NRBC # BLD: 0 /100 — SIGNIFICANT CHANGE UP (ref 0–0)
OVALOCYTES BLD QL SMEAR: SLIGHT — SIGNIFICANT CHANGE UP
PHOSPHATE SERPL-MCNC: 2.5 MG/DL — SIGNIFICANT CHANGE UP (ref 2.5–4.5)
PLAT MORPH BLD: NORMAL — SIGNIFICANT CHANGE UP
PLATELET # BLD AUTO: 261 K/UL — SIGNIFICANT CHANGE UP (ref 150–400)
POIKILOCYTOSIS BLD QL AUTO: SLIGHT — SIGNIFICANT CHANGE UP
POTASSIUM SERPL-MCNC: 4.1 MMOL/L — SIGNIFICANT CHANGE UP (ref 3.5–5.3)
POTASSIUM SERPL-SCNC: 4.1 MMOL/L — SIGNIFICANT CHANGE UP (ref 3.5–5.3)
PROT PATTERN SERPL ELPH-IMP: SIGNIFICANT CHANGE UP
RBC # BLD: 3.74 M/UL — LOW (ref 4.2–5.8)
RBC # FLD: 13.5 % — SIGNIFICANT CHANGE UP (ref 10.3–14.5)
RBC BLD AUTO: ABNORMAL
SODIUM SERPL-SCNC: 139 MMOL/L — SIGNIFICANT CHANGE UP (ref 135–145)
VARIANT LYMPHS # BLD: 5 % — SIGNIFICANT CHANGE UP (ref 0–6)
WBC # BLD: 9.1 K/UL — SIGNIFICANT CHANGE UP (ref 3.8–10.5)
WBC # FLD AUTO: 9.1 K/UL — SIGNIFICANT CHANGE UP (ref 3.8–10.5)

## 2021-01-25 PROCEDURE — 76775 US EXAM ABDO BACK WALL LIM: CPT | Mod: 26

## 2021-01-25 RX ORDER — OXYCODONE HYDROCHLORIDE 5 MG/1
15 TABLET ORAL EVERY 8 HOURS
Refills: 0 | Status: DISCONTINUED | OUTPATIENT
Start: 2021-01-25 | End: 2021-01-26

## 2021-01-25 RX ORDER — ACETAMINOPHEN 500 MG
650 TABLET ORAL EVERY 6 HOURS
Refills: 0 | Status: DISCONTINUED | OUTPATIENT
Start: 2021-01-25 | End: 2021-01-26

## 2021-01-25 RX ORDER — MIDODRINE HYDROCHLORIDE 2.5 MG/1
5 TABLET ORAL THREE TIMES A DAY
Refills: 0 | Status: DISCONTINUED | OUTPATIENT
Start: 2021-01-25 | End: 2021-01-26

## 2021-01-25 RX ADMIN — DULOXETINE HYDROCHLORIDE 30 MILLIGRAM(S): 30 CAPSULE, DELAYED RELEASE ORAL at 17:16

## 2021-01-25 RX ADMIN — OXCARBAZEPINE 300 MILLIGRAM(S): 300 TABLET, FILM COATED ORAL at 17:16

## 2021-01-25 RX ADMIN — ATORVASTATIN CALCIUM 20 MILLIGRAM(S): 80 TABLET, FILM COATED ORAL at 20:49

## 2021-01-25 RX ADMIN — MIDODRINE HYDROCHLORIDE 5 MILLIGRAM(S): 2.5 TABLET ORAL at 13:00

## 2021-01-25 RX ADMIN — GABAPENTIN 600 MILLIGRAM(S): 400 CAPSULE ORAL at 20:55

## 2021-01-25 RX ADMIN — Medication 1000 UNIT(S): at 13:00

## 2021-01-25 RX ADMIN — Medication 1 APPLICATION(S): at 17:17

## 2021-01-25 RX ADMIN — Medication 25 MICROGRAM(S): at 06:01

## 2021-01-25 RX ADMIN — HEPARIN SODIUM 5000 UNIT(S): 5000 INJECTION INTRAVENOUS; SUBCUTANEOUS at 20:48

## 2021-01-25 RX ADMIN — Medication 40 MILLIGRAM(S): at 06:00

## 2021-01-25 RX ADMIN — ALBUTEROL 2 PUFF(S): 90 AEROSOL, METERED ORAL at 17:28

## 2021-01-25 RX ADMIN — Medication 1 APPLICATION(S): at 06:01

## 2021-01-25 RX ADMIN — TAMSULOSIN HYDROCHLORIDE 0.4 MILLIGRAM(S): 0.4 CAPSULE ORAL at 20:49

## 2021-01-25 RX ADMIN — Medication 650 MILLIGRAM(S): at 22:56

## 2021-01-25 RX ADMIN — GABAPENTIN 600 MILLIGRAM(S): 400 CAPSULE ORAL at 06:00

## 2021-01-25 RX ADMIN — PANTOPRAZOLE SODIUM 40 MILLIGRAM(S): 20 TABLET, DELAYED RELEASE ORAL at 06:00

## 2021-01-25 RX ADMIN — MIDODRINE HYDROCHLORIDE 5 MILLIGRAM(S): 2.5 TABLET ORAL at 17:16

## 2021-01-25 RX ADMIN — QUETIAPINE FUMARATE 100 MILLIGRAM(S): 200 TABLET, FILM COATED ORAL at 20:49

## 2021-01-25 RX ADMIN — OXCARBAZEPINE 300 MILLIGRAM(S): 300 TABLET, FILM COATED ORAL at 06:00

## 2021-01-25 RX ADMIN — Medication 40 MILLIGRAM(S): at 17:17

## 2021-01-25 RX ADMIN — Medication 81 MILLIGRAM(S): at 13:00

## 2021-01-25 RX ADMIN — HEPARIN SODIUM 5000 UNIT(S): 5000 INJECTION INTRAVENOUS; SUBCUTANEOUS at 13:00

## 2021-01-25 RX ADMIN — Medication 1 MILLIGRAM(S): at 13:00

## 2021-01-25 RX ADMIN — OXYCODONE HYDROCHLORIDE 15 MILLIGRAM(S): 5 TABLET ORAL at 20:50

## 2021-01-25 RX ADMIN — DULOXETINE HYDROCHLORIDE 30 MILLIGRAM(S): 30 CAPSULE, DELAYED RELEASE ORAL at 06:01

## 2021-01-25 RX ADMIN — BUDESONIDE AND FORMOTEROL FUMARATE DIHYDRATE 2 PUFF(S): 160; 4.5 AEROSOL RESPIRATORY (INHALATION) at 13:01

## 2021-01-25 RX ADMIN — GABAPENTIN 600 MILLIGRAM(S): 400 CAPSULE ORAL at 13:10

## 2021-01-25 RX ADMIN — SODIUM CHLORIDE 50 MILLILITER(S): 9 INJECTION INTRAMUSCULAR; INTRAVENOUS; SUBCUTANEOUS at 22:57

## 2021-01-25 RX ADMIN — Medication 1 PATCH: at 13:01

## 2021-01-25 RX ADMIN — OXYCODONE HYDROCHLORIDE 15 MILLIGRAM(S): 5 TABLET ORAL at 07:20

## 2021-01-25 RX ADMIN — MIDODRINE HYDROCHLORIDE 5 MILLIGRAM(S): 2.5 TABLET ORAL at 10:21

## 2021-01-25 RX ADMIN — HEPARIN SODIUM 5000 UNIT(S): 5000 INJECTION INTRAVENOUS; SUBCUTANEOUS at 06:01

## 2021-01-25 RX ADMIN — Medication 1 PATCH: at 20:08

## 2021-01-25 RX ADMIN — BUDESONIDE AND FORMOTEROL FUMARATE DIHYDRATE 2 PUFF(S): 160; 4.5 AEROSOL RESPIRATORY (INHALATION) at 20:49

## 2021-01-25 RX ADMIN — OXYCODONE HYDROCHLORIDE 15 MILLIGRAM(S): 5 TABLET ORAL at 13:10

## 2021-01-25 NOTE — PROGRESS NOTE ADULT - PROBLEM SELECTOR PLAN 3
- BUN/Cr 15/1.75   - baseline unknown  - could be  prerenal given poor PO intake   - c/w IVF   - f/u BMP   f/u urine study  f/u  FeNa - BUN/Cr 15/1.75   - baseline unknown  - could be  prerenal given poor PO intake   - c/w IVF   - Cr normal now likely dehydration   -Encourage Oral intake of water

## 2021-01-25 NOTE — PHYSICAL THERAPY INITIAL EVALUATION ADULT - DIAGNOSIS, PT EVAL
Decline in functional independence due to decreased endurance. Difficulty ambulating long distances.

## 2021-01-25 NOTE — PHYSICAL THERAPY INITIAL EVALUATION ADULT - PERTINENT HX OF CURRENT PROBLEM, REHAB EVAL
Pt. admitted to hospital from [facility] due to syncope and COPD exacerbation.  PMHx of COPD, orthostatic hypotension, BPH, hypothyroid. anxiety, and depression, PSH of left hip pin fixation

## 2021-01-25 NOTE — PROGRESS NOTE ADULT - SUBJECTIVE AND OBJECTIVE BOX
PGY-1 Progress Note discussed with attending    PAGER #: [[phone]] TILL 5:00 PM  PLEASE CONTACT ON CALL TEAM:  - On Call Team (Please refer to Lai) FROM 5:00 PM - 8:30PM  - Nightfloat Team FROM 8:30 -7:30 AM    CHIEF COMPLAINT & BRIEF HOSPITAL COURSE:    INTERVAL HPI/OVERNIGHT EVENTS:       REVIEW OF SYSTEMS:  CONSTITUTIONAL: No fever, weight loss, or fatigue  RESPIRATORY: No cough, wheezing, chills or hemoptysis; No shortness of breath  CARDIOVASCULAR: No chest pain, palpitations, dizziness, or leg swelling  GASTROINTESTINAL: No abdominal pain. No nausea, vomiting, or hematemesis; No diarrhea or constipation. No melena or hematochezia.  GENITOURINARY: No dysuria or hematuria, urinary frequency  NEUROLOGICAL: No headaches, memory loss, loss of strength, numbness, or tremors  SKIN: No itching, burning, rashes, or lesions     Vital Signs Last 24 Hrs  T(C): 36.5 (25 Jan 2021 07:31), Max: 37.1 (24 Jan 2021 19:55)  T(F): 97.7 (25 Jan 2021 07:31), Max: 98.7 (24 Jan 2021 19:55)  HR: 58 (25 Jan 2021 07:31) (58 - 81)  BP: 111/65 (25 Jan 2021 07:31) (111/65 - 161/78)  BP(mean): --  RR: 18 (25 Jan 2021 07:31) (17 - 19)  SpO2: 98% (25 Jan 2021 07:31) (95% - 98%)    PHYSICAL EXAMINATION:  GENERAL: NAD, well built  HEAD:  Atraumatic, Normocephalic  EYES:  conjunctiva and sclera clear  NECK: Supple, No JVD, Normal thyroid  CHEST/LUNG: Clear to auscultation. Clear to percussion bilaterally; No rales, rhonchi, wheezing, or rubs  HEART: Regular rate and rhythm; No murmurs, rubs, or gallops  ABDOMEN: Soft, Nontender, Nondistended; Bowel sounds present  NERVOUS SYSTEM:  Alert & Oriented X3,    EXTREMITIES:  2+ Peripheral Pulses, No clubbing, cyanosis, or edema  SKIN: warm dry                          12.7   9.10  )-----------( 261      ( 25 Jan 2021 07:01 )             38.5     01-25    139  |  104  |  22<H>  ----------------------------<  161<H>  4.1   |  26  |  1.02    Ca    8.6      25 Jan 2021 07:01  Phos  2.5     01-25  Mg     2.2     01-25    TPro  6.6  /  Alb  x   /  TBili  x   /  DBili  x   /  AST  x   /  ALT  x   /  AlkPhos  x   01-24    LIVER FUNCTIONS - ( 24 Jan 2021 21:06 )  Alb: x     / Pro: 6.6 g/dL / ALK PHOS: x     / ALT: x     / AST: x     / GGT: x           CARDIAC MARKERS ( 24 Jan 2021 06:10 )  <0.015 ng/mL / x     / x     / x     / x      CARDIAC MARKERS ( 23 Jan 2021 23:09 )  <0.015 ng/mL / x     / x     / x     / x              CAPILLARY BLOOD GLUCOSE      RADIOLOGY & ADDITIONAL TESTS:                   PGY-1 Progress Note discussed with attending    PAGER #: [[phone]] TILL 5:00 PM  PLEASE CONTACT ON CALL TEAM:  - On Call Team (Please refer to Lai) FROM 5:00 PM - 8:30PM  - Nightfloat Team FROM 8:30 -7:30 AM    CHIEF COMPLAINT & BRIEF HOSPITAL COURSE: 64 y/o male from [facility] living, walks with walker, with PMHx of COPD, orthostatic hypotension, BPH, hypothyroid. anxiety, and depression, PSH of left hip pin fixation presents with complaint of weakness and shortness of breath. As per patient he had the covid infection previously, and had covid vaccine on Fri (2 days ago) and has been feeling short of breath and sick since.  As per patient,  he was walking with a walker, suddenly he felt dizzy and weak and laid down on the floor. He thinks he lost consciousness at some point. He heard that people was trying to wake him up, but he was too weak to respond. As per pt, he always have orthostatic hypotension, usually his SBP is 120s, and when he stands up, it drops to 80s.       In the ED, patient  was found to be hypotensive to 74/45, temperature of 94 and sat of 90%.  pt speaking 4-5 word sentences.  s/p Duoneb Solu-medrol 125mg    INTERVAL HPI/OVERNIGHT EVENTS: No event overnight. Pt examined at bedside this morning no complaints but wants to go back to facility. Orthostatic positive twice         REVIEW OF SYSTEMS:  CONSTITUTIONAL: No fever, weight loss, or fatigue  RESPIRATORY: No cough, wheezing, chills or hemoptysis; No shortness of breath  CARDIOVASCULAR: No chest pain, palpitations, dizziness, or leg swelling  GASTROINTESTINAL: No abdominal pain. No nausea, vomiting, or hematemesis; No diarrhea or constipation. No melena or hematochezia.  GENITOURINARY: No dysuria or hematuria, urinary frequency  NEUROLOGICAL: No headaches, memory loss, loss of strength, numbness, or tremors  SKIN: No itching, burning, rashes, or lesions     Vital Signs Last 24 Hrs  T(C): 36.5 (25 Jan 2021 07:31), Max: 37.1 (24 Jan 2021 19:55)  T(F): 97.7 (25 Jan 2021 07:31), Max: 98.7 (24 Jan 2021 19:55)  HR: 58 (25 Jan 2021 07:31) (58 - 81)  BP: 111/65 (25 Jan 2021 07:31) (111/65 - 161/78)  BP(mean): --  RR: 18 (25 Jan 2021 07:31) (17 - 19)  SpO2: 98% (25 Jan 2021 07:31) (95% - 98%)    PHYSICAL EXAMINATION:  GENERAL: NAD, lying comfortably on bed   HEAD:  Atraumatic, Normocephalic  EYES:  conjunctiva and sclera clear  NECK: Supple, No JVD, Normal thyroid  CHEST/LUNG: mild wheezes b/l   HEART: Regular rate and rhythm; No murmurs, rubs, or gallops  ABDOMEN: Soft, Nontender, Nondistended; Bowel sounds present  NERVOUS SYSTEM:  Alert & Oriented X3,    EXTREMITIES:  2+ Peripheral Pulses, No clubbing, cyanosis, or edema  SKIN: warm dry    MEDICATIONS  (STANDING):  aspirin  chewable 81 milliGRAM(s) Oral daily  atorvastatin 20 milliGRAM(s) Oral at bedtime  budesonide 160 MICROgram(s)/formoterol 4.5 MICROgram(s) Inhaler 2 Puff(s) Inhalation two times a day  cholecalciferol 1000 Unit(s) Oral daily  DULoxetine 30 milliGRAM(s) Oral two times a day  folic acid 1 milliGRAM(s) Oral daily  gabapentin 600 milliGRAM(s) Oral three times a day  heparin   Injectable 5000 Unit(s) SubCutaneous every 8 hours  levothyroxine 25 MICROGram(s) Oral <User Schedule>  methylPREDNISolone sodium succinate Injectable 40 milliGRAM(s) IV Push two times a day  midodrine. 5 milliGRAM(s) Oral three times a day  nicotine -  14 mG/24Hr(s) Patch 1 patch Transdermal daily  OXcarbazepine 300 milliGRAM(s) Oral two times a day  pantoprazole    Tablet 40 milliGRAM(s) Oral before breakfast  QUEtiapine 100 milliGRAM(s) Oral at bedtime  sodium chloride 0.9%. 1000 milliLiter(s) (50 mL/Hr) IV Continuous <Continuous>  tamsulosin 0.4 milliGRAM(s) Oral at bedtime  triamcinolone 0.1% Ointment 1 Application(s) Topical two times a day    MEDICATIONS  (PRN):  ALBUTerol    90 MICROgram(s) HFA Inhaler 2 Puff(s) Inhalation every 6 hours PRN Shortness of Breath and/or Wheezing  lidocaine 2% Viscous 15 milliLiter(s) Swish and Spit every 3 hours PRN Mouth Care  oxyCODONE    IR 15 milliGRAM(s) Oral three times a day PRN Severe Pain (7 - 10)                          12.7   9.10  )-----------( 261      ( 25 Jan 2021 07:01 )             38.5     01-25    139  |  104  |  22<H>  ----------------------------<  161<H>  4.1   |  26  |  1.02    Ca    8.6      25 Jan 2021 07:01  Phos  2.5     01-25  Mg     2.2     01-25    TPro  6.6  /  Alb  x   /  TBili  x   /  DBili  x   /  AST  x   /  ALT  x   /  AlkPhos  x   01-24    LIVER FUNCTIONS - ( 24 Jan 2021 21:06 )  Alb: x     / Pro: 6.6 g/dL / ALK PHOS: x     / ALT: x     / AST: x     / GGT: x           CARDIAC MARKERS ( 24 Jan 2021 06:10 )  <0.015 ng/mL / x     / x     / x     / x      CARDIAC MARKERS ( 23 Jan 2021 23:09 )  <0.015 ng/mL / x     / x     / x     / x              CAPILLARY BLOOD GLUCOSE      RADIOLOGY & ADDITIONAL TESTS:

## 2021-01-25 NOTE — PROGRESS NOTE ADULT - SUBJECTIVE AND OBJECTIVE BOX
CARDIOLOGY/MEDICAL ATTENDING    CHIEF COMPLAINT:Patient is a 65y old  Male who presents with a chief complaint of Syncope and COPD exacerbation .Pt feeling better,    	  REVIEW OF SYSTEMS:  CONSTITUTIONAL: No fever, weight loss, or fatigue  EYES: No eye pain, visual disturbances, or discharge  ENT:  No difficulty hearing, tinnitus, vertigo; No sinus or throat pain  NECK: No pain or stiffness  RESPIRATORY: No cough, wheezing, chills or hemoptysis; No Shortness of Breath  CARDIOVASCULAR: No chest pain, palpitations, passing out, dizziness, or leg swelling  GASTROINTESTINAL: No abdominal or epigastric pain. No nausea, vomiting, or hematemesis; No diarrhea or constipation. No melena or hematochezia.  GENITOURINARY: No dysuria, frequency, hematuria, or incontinence  NEUROLOGICAL: No headaches, memory loss, loss of strength, numbness, or tremors  SKIN: No itching, burning, rashes, or lesions   LYMPH Nodes: No enlarged glands  ENDOCRINE: No heat or cold intolerance; No hair loss  MUSCULOSKELETAL: No joint pain or swelling; No muscle, back, or extremity pain  PSYCHIATRIC: No depression, anxiety, mood swings, or difficulty sleeping  HEME/LYMPH: No easy bruising, or bleeding gums  ALLERGY AND IMMUNOLOGIC: No hives or eczema	        PHYSICAL EXAM:  T(C): 36.3 (01-25-21 @ 04:59), Max: 37.1 (01-24-21 @ 19:55)  HR: 61 (01-25-21 @ 04:59) (61 - 81)  BP: 119/62 (01-25-21 @ 04:59) (119/62 - 161/78)  RR: 19 (01-25-21 @ 04:59) (17 - 19)  SpO2: 95% (01-25-21 @ 04:59) (95% - 97%)        Appearance: Normal	  HEENT:   Normal oral mucosa, PERRL, EOMI	  Lymphatic: No lymphadenopathy  Cardiovascular: Normal S1 S2, No JVD, No murmurs, No edema  Respiratory: Lungs clear to auscultation	  Psychiatry: A & O x 3, Mood & affect appropriate  Gastrointestinal:  Soft, Non-tender, + BS	  Skin: No rashes, No ecchymoses, No cyanosis	  Neurologic: Non-focal  Extremities: Normal range of motion, No clubbing, cyanosis or edema  Vascular: Peripheral pulses palpable 2+ bilaterally    MEDICATIONS  (STANDING):  aspirin  chewable 81 milliGRAM(s) Oral daily  atorvastatin 20 milliGRAM(s) Oral at bedtime  budesonide 160 MICROgram(s)/formoterol 4.5 MICROgram(s) Inhaler 2 Puff(s) Inhalation two times a day  cholecalciferol 1000 Unit(s) Oral daily  DULoxetine 30 milliGRAM(s) Oral two times a day  folic acid 1 milliGRAM(s) Oral daily  gabapentin 600 milliGRAM(s) Oral three times a day  heparin   Injectable 5000 Unit(s) SubCutaneous every 8 hours  levothyroxine 25 MICROGram(s) Oral <User Schedule>  methylPREDNISolone sodium succinate Injectable 40 milliGRAM(s) IV Push two times a day  midodrine. 5 milliGRAM(s) Oral three times a day  nicotine -  14 mG/24Hr(s) Patch 1 patch Transdermal daily  OXcarbazepine 300 milliGRAM(s) Oral two times a day  pantoprazole    Tablet 40 milliGRAM(s) Oral before breakfast  QUEtiapine 100 milliGRAM(s) Oral at bedtime  sodium chloride 0.9%. 1000 milliLiter(s) (50 mL/Hr) IV Continuous <Continuous>  tamsulosin 0.4 milliGRAM(s) Oral at bedtime  triamcinolone 0.1% Ointment 1 Application(s) Topical two times a day      TELEMETRY: 	NSR      	  	  LABS:	 	      CARDIAC MARKERS ( 24 Jan 2021 06:10 )  <0.015 ng/mL / x     / x     / x     / x      CARDIAC MARKERS ( 23 Jan 2021 23:09 )  <0.015 ng/mL / x     / x     / x     / x                             12.7   x     )-----------( 261      ( 25 Jan 2021 07:01 )             38.5     01-25    139  |  104  |  22<H>  ----------------------------<  161<H>  4.1   |  26  |  1.02    Ca    8.6      25 Jan 2021 07:01  Phos  2.5     01-25  Mg     2.2     01-25    TPro  6.6  /  Alb  x   /  TBili  x   /  DBili  x   /  AST  x   /  ALT  x   /  AlkPhos  x   01-24    Lipid Profile: Cholesterol 180  LDL --  HDL 65        TSH: Thyroid Stimulating Hormone, Serum: 0.35 uU/mL (01-24 @ 06:10)      	    Transthoracic Echocardiogram (01.24.21 @ 14:48) >  OBSERVATIONS:  Mitral Valve: Normal mitral valve.  Aortic Root: Aortic Root: 3.7 cm.  Aortic Valve: Normal trileaflet aortic valve.  Left Atrium: Normal left atrium.  LA volume index = 17  cc/m2.  Left Ventricle: Normal Left Ventricular Systolic Function,  (EF = 55 to 60%) Normal left ventricular internal  dimensions and wall thicknesses. Normal diastolic function.  Right Heart: Normal right atrium. Normal right ventricular  size and systolic function (TAPSE 2.3 cm). There is trace  tricuspid regurgitation. There is trace pulmonic  regurgitation.  Pericardium/PleuraNormal pericardium with no pericardial  effusion.  Hemodynamic: RV systolic pressure is normal at  17 mm Hg.

## 2021-01-25 NOTE — PROGRESS NOTE ADULT - PROBLEM SELECTOR PLAN 9
IMPROVE VTE Individual Risk Assessment  RISK                                                                Points  [  ] Previous VTE                                                  3  [  ] Thrombophilia                                               2  [  ] Lower limb paralysis                                      2        (unable to hold up >15 seconds)    [  ] Current Cancer                                              2         (within 6 months)  [x ] Immobilization > 24 hrs                                1  [  ] ICU/CCU stay > 24 hours                              1  [x  ] Age > 60                                                      1  IMPROVE VTE Score ___2______  Heparin SC for DVT ppx -Continue with Levothyroxine

## 2021-01-25 NOTE — PROGRESS NOTE ADULT - SUBJECTIVE AND OBJECTIVE BOX
Pt is awake, alert, lying in bed in NAD. Feels well, saturating 98% on Ra.   INTERVAL HPI/OVERNIGHT EVENTS:      VITAL SIGNS:  T(F): 97.7 (21 @ 07:31)  HR: 58 (21 @ 07:31)  BP: 111/65 (21 @ 07:31)  RR: 18 (21 @ 07:31)  SpO2: 98% (21 @ 07:31)  Wt(kg): --  I&O's Detail          REVIEW OF SYSTEMS:    CONSTITUTIONAL:  No fevers, chills, sweats    HEENT:  Eyes:  No diplopia or blurred vision. ENT:  No earache, sore throat or runny nose.    CARDIOVASCULAR:  No pressure, squeezing, tightness, or heaviness about the chest; no palpitations.    RESPIRATORY:  Per HPI    GASTROINTESTINAL:  No abdominal pain, nausea, vomiting or diarrhea.    GENITOURINARY:  No dysuria, frequency or urgency.    NEUROLOGIC:  No paresthesias, fasciculations, seizures or weakness.    PSYCHIATRIC:  No disorder of thought or mood.      PHYSICAL EXAM:    Constitutional: Well developed and nourished  Eyes:Perrla  ENMT: normal  Neck:supple  Respiratory: good air entry  Cardiovascular: S1 S2 regular  Gastrointestinal: Soft, Non tender  Extremities: No edema  Vascular:normal  Neurological:Awake, alert,Ox3  Musculoskeletal:Normal      MEDICATIONS  (STANDING):  aspirin  chewable 81 milliGRAM(s) Oral daily  atorvastatin 20 milliGRAM(s) Oral at bedtime  budesonide 160 MICROgram(s)/formoterol 4.5 MICROgram(s) Inhaler 2 Puff(s) Inhalation two times a day  cholecalciferol 1000 Unit(s) Oral daily  DULoxetine 30 milliGRAM(s) Oral two times a day  folic acid 1 milliGRAM(s) Oral daily  gabapentin 600 milliGRAM(s) Oral three times a day  heparin   Injectable 5000 Unit(s) SubCutaneous every 8 hours  levothyroxine 25 MICROGram(s) Oral <User Schedule>  methylPREDNISolone sodium succinate Injectable 40 milliGRAM(s) IV Push two times a day  midodrine. 5 milliGRAM(s) Oral three times a day  nicotine -  14 mG/24Hr(s) Patch 1 patch Transdermal daily  OXcarbazepine 300 milliGRAM(s) Oral two times a day  pantoprazole    Tablet 40 milliGRAM(s) Oral before breakfast  QUEtiapine 100 milliGRAM(s) Oral at bedtime  sodium chloride 0.9%. 1000 milliLiter(s) (50 mL/Hr) IV Continuous <Continuous>  tamsulosin 0.4 milliGRAM(s) Oral at bedtime  triamcinolone 0.1% Ointment 1 Application(s) Topical two times a day    MEDICATIONS  (PRN):  ALBUTerol    90 MICROgram(s) HFA Inhaler 2 Puff(s) Inhalation every 6 hours PRN Shortness of Breath and/or Wheezing  lidocaine 2% Viscous 15 milliLiter(s) Swish and Spit every 3 hours PRN Mouth Care  oxyCODONE    IR 15 milliGRAM(s) Oral three times a day PRN Severe Pain (7 - 10)      Allergies    No Known Allergies    Intolerances        LABS:                        12.7   9.10  )-----------( 261      ( 2021 07:01 )             38.5         139  |  104  |  22<H>  ----------------------------<  161<H>  4.1   |  26  |  1.02    Ca    8.6      2021 07:01  Phos  2.5       Mg     2.2         TPro  6.6  /  Alb  x   /  TBili  x   /  DBili  x   /  AST  x   /  ALT  x   /  AlkPhos  x         Urinalysis Basic - ( 2021 06:59 )    Color: Yellow / Appearance: Clear / S.020 / pH: x  Gluc: x / Ketone: Negative  / Bili: Negative / Urobili: Negative   Blood: x / Protein: 15 / Nitrite: Negative   Leuk Esterase: Negative / RBC: 5-10 /HPF / WBC 0-2 /HPF   Sq Epi: x / Non Sq Epi: Few /HPF / Bacteria: Trace /HPF        CARDIAC MARKERS ( 2021 06:10 )  <0.015 ng/mL / x     / x     / x     / x      CARDIAC MARKERS ( 2021 23:09 )  <0.015 ng/mL / x     / x     / x     / x          CAPILLARY BLOOD GLUCOSE            RADIOLOGY & ADDITIONAL TESTS:  < from: US Duplex Carotid Arteries Complete, Bilateral (21 @ 10:40) >  IMPRESSION: No significant hemodynamic stenosis of either carotid artery.    Measurement of carotid stenosis is based on velocity parameters that correlate the residual internal carotid diameter with that of the more distal vessel in accordance with a method such as the [employer] Symptomatic Carotid Endarterectomy Trial (NASCET).      < end of copied text >    CXR:    Ct scan chest:  < from: CT Head No Cont (21 @ 08:35) >    IMPRESSION:    No acute intracranial findings.      < end of copied text >    ekg;    echo:

## 2021-01-25 NOTE — PHYSICAL THERAPY INITIAL EVALUATION ADULT - ADDITIONAL COMMENTS
As per pt., he owns a rollator and used it to ambulate far distances. Pt. was independent with all ADLs prior to admissions.

## 2021-01-25 NOTE — PROGRESS NOTE ADULT - PROBLEM SELECTOR PLAN 2
p/w SOB, spO2 was 90%  s/p Solu-medrol and duonebs  Currently on RA, no respiratory distress, however wheezing all over the lungs  c/w Solu-medrol 40mg IV BID  c/w inhalers  monitor respiratory status -p/w SOB, spO2 was 90%  -s/p Solu-medrol and Duoneb  -Currently on RA, no respiratory distress, however wheezing all over the lungs  c/w Solu-medrol 40mg IV BID  c/w inhalers  monitor respiratory status

## 2021-01-25 NOTE — PROGRESS NOTE ADULT - PROBLEM SELECTOR PLAN 1
- EKG: NSR   - Trops 1st set -ve   - f/u Trops 2nd set in AM  - Tele monitoring   - pt has hx of orthostatic hypotension  - f/u Orthostatics   - f/u carotid doppler   - f/u Echo  - f/u PT Eval - EKG: NSR   - Trops neg X2  - Tele monitoring   - pt has hx of orthostatic hypotension, was on midodrine earlier then dc'ed later   -  Orthostatics X2 neg on this admission   - carotid doppler unremarkable   - Echo normal   - f/u PT Eval  - Started on midodrine today TID

## 2021-01-26 ENCOUNTER — TRANSCRIPTION ENCOUNTER (OUTPATIENT)
Age: 66
End: 2021-01-26

## 2021-01-26 VITALS
RESPIRATION RATE: 18 BRPM | TEMPERATURE: 98 F | OXYGEN SATURATION: 93 % | HEART RATE: 58 BPM | DIASTOLIC BLOOD PRESSURE: 70 MMHG | SYSTOLIC BLOOD PRESSURE: 156 MMHG

## 2021-01-26 PROCEDURE — 82746 ASSAY OF FOLIC ACID SERUM: CPT

## 2021-01-26 PROCEDURE — 84155 ASSAY OF PROTEIN SERUM: CPT

## 2021-01-26 PROCEDURE — U0005: CPT

## 2021-01-26 PROCEDURE — 83735 ASSAY OF MAGNESIUM: CPT

## 2021-01-26 PROCEDURE — 81001 URINALYSIS AUTO W/SCOPE: CPT

## 2021-01-26 PROCEDURE — 84484 ASSAY OF TROPONIN QUANT: CPT

## 2021-01-26 PROCEDURE — 83550 IRON BINDING TEST: CPT

## 2021-01-26 PROCEDURE — 84300 ASSAY OF URINE SODIUM: CPT

## 2021-01-26 PROCEDURE — 93005 ELECTROCARDIOGRAM TRACING: CPT

## 2021-01-26 PROCEDURE — 80053 COMPREHEN METABOLIC PANEL: CPT

## 2021-01-26 PROCEDURE — 82570 ASSAY OF URINE CREATININE: CPT

## 2021-01-26 PROCEDURE — 83540 ASSAY OF IRON: CPT

## 2021-01-26 PROCEDURE — 80048 BASIC METABOLIC PNL TOTAL CA: CPT

## 2021-01-26 PROCEDURE — 84100 ASSAY OF PHOSPHORUS: CPT

## 2021-01-26 PROCEDURE — 76775 US EXAM ABDO BACK WALL LIM: CPT

## 2021-01-26 PROCEDURE — 84165 PROTEIN E-PHORESIS SERUM: CPT

## 2021-01-26 PROCEDURE — 99285 EMERGENCY DEPT VISIT HI MDM: CPT

## 2021-01-26 PROCEDURE — 99053 MED SERV 10PM-8AM 24 HR FAC: CPT

## 2021-01-26 PROCEDURE — 87635 SARS-COV-2 COVID-19 AMP PRB: CPT

## 2021-01-26 PROCEDURE — 86803 HEPATITIS C AB TEST: CPT

## 2021-01-26 PROCEDURE — 82728 ASSAY OF FERRITIN: CPT

## 2021-01-26 PROCEDURE — 94640 AIRWAY INHALATION TREATMENT: CPT

## 2021-01-26 PROCEDURE — 82306 VITAMIN D 25 HYDROXY: CPT

## 2021-01-26 PROCEDURE — 84443 ASSAY THYROID STIM HORMONE: CPT

## 2021-01-26 PROCEDURE — 93880 EXTRACRANIAL BILAT STUDY: CPT

## 2021-01-26 PROCEDURE — 82533 TOTAL CORTISOL: CPT

## 2021-01-26 PROCEDURE — 70450 CT HEAD/BRAIN W/O DYE: CPT

## 2021-01-26 PROCEDURE — 83921 ORGANIC ACID SINGLE QUANT: CPT

## 2021-01-26 PROCEDURE — 83036 HEMOGLOBIN GLYCOSYLATED A1C: CPT

## 2021-01-26 PROCEDURE — 36415 COLL VENOUS BLD VENIPUNCTURE: CPT

## 2021-01-26 PROCEDURE — 84156 ASSAY OF PROTEIN URINE: CPT

## 2021-01-26 PROCEDURE — 86769 SARS-COV-2 COVID-19 ANTIBODY: CPT

## 2021-01-26 PROCEDURE — 83615 LACTATE (LD) (LDH) ENZYME: CPT

## 2021-01-26 PROCEDURE — 71045 X-RAY EXAM CHEST 1 VIEW: CPT

## 2021-01-26 PROCEDURE — 80061 LIPID PANEL: CPT

## 2021-01-26 PROCEDURE — 97161 PT EVAL LOW COMPLEX 20 MIN: CPT

## 2021-01-26 PROCEDURE — 93306 TTE W/DOPPLER COMPLETE: CPT

## 2021-01-26 PROCEDURE — 83090 ASSAY OF HOMOCYSTEINE: CPT

## 2021-01-26 PROCEDURE — 82607 VITAMIN B-12: CPT

## 2021-01-26 PROCEDURE — 85025 COMPLETE CBC W/AUTO DIFF WBC: CPT

## 2021-01-26 PROCEDURE — 85045 AUTOMATED RETICULOCYTE COUNT: CPT

## 2021-01-26 RX ORDER — OXYCODONE HYDROCHLORIDE 5 MG/1
1 TABLET ORAL
Qty: 0 | Refills: 0 | DISCHARGE

## 2021-01-26 RX ORDER — LANOLIN ALCOHOL/MO/W.PET/CERES
3 CREAM (GRAM) TOPICAL ONCE
Refills: 0 | Status: COMPLETED | OUTPATIENT
Start: 2021-01-26 | End: 2021-01-26

## 2021-01-26 RX ORDER — MIDODRINE HYDROCHLORIDE 2.5 MG/1
1 TABLET ORAL
Qty: 63 | Refills: 0
Start: 2021-01-26 | End: 2021-02-15

## 2021-01-26 RX ORDER — FOLIC ACID 0.8 MG
1 TABLET ORAL
Qty: 0 | Refills: 0 | DISCHARGE

## 2021-01-26 RX ORDER — OXYCODONE HYDROCHLORIDE 5 MG/1
3 TABLET ORAL
Qty: 0 | Refills: 0 | DISCHARGE

## 2021-01-26 RX ORDER — LANOLIN ALCOHOL/MO/W.PET/CERES
3 CREAM (GRAM) TOPICAL AT BEDTIME
Refills: 0 | Status: DISCONTINUED | OUTPATIENT
Start: 2021-01-26 | End: 2021-01-26

## 2021-01-26 RX ADMIN — Medication 40 MILLIGRAM(S): at 05:18

## 2021-01-26 RX ADMIN — Medication 1 PATCH: at 07:20

## 2021-01-26 RX ADMIN — GABAPENTIN 600 MILLIGRAM(S): 400 CAPSULE ORAL at 12:17

## 2021-01-26 RX ADMIN — HEPARIN SODIUM 5000 UNIT(S): 5000 INJECTION INTRAVENOUS; SUBCUTANEOUS at 05:11

## 2021-01-26 RX ADMIN — PANTOPRAZOLE SODIUM 40 MILLIGRAM(S): 20 TABLET, DELAYED RELEASE ORAL at 05:12

## 2021-01-26 RX ADMIN — Medication 3 MILLIGRAM(S): at 12:17

## 2021-01-26 RX ADMIN — Medication 25 MICROGRAM(S): at 05:12

## 2021-01-26 RX ADMIN — OXCARBAZEPINE 300 MILLIGRAM(S): 300 TABLET, FILM COATED ORAL at 05:12

## 2021-01-26 RX ADMIN — BUDESONIDE AND FORMOTEROL FUMARATE DIHYDRATE 2 PUFF(S): 160; 4.5 AEROSOL RESPIRATORY (INHALATION) at 10:18

## 2021-01-26 RX ADMIN — GABAPENTIN 600 MILLIGRAM(S): 400 CAPSULE ORAL at 05:12

## 2021-01-26 RX ADMIN — Medication 1 PATCH: at 12:19

## 2021-01-26 RX ADMIN — Medication 3 MILLIGRAM(S): at 02:10

## 2021-01-26 RX ADMIN — OXYCODONE HYDROCHLORIDE 15 MILLIGRAM(S): 5 TABLET ORAL at 14:09

## 2021-01-26 RX ADMIN — MIDODRINE HYDROCHLORIDE 5 MILLIGRAM(S): 2.5 TABLET ORAL at 05:12

## 2021-01-26 RX ADMIN — DULOXETINE HYDROCHLORIDE 30 MILLIGRAM(S): 30 CAPSULE, DELAYED RELEASE ORAL at 05:12

## 2021-01-26 RX ADMIN — OXYCODONE HYDROCHLORIDE 15 MILLIGRAM(S): 5 TABLET ORAL at 06:23

## 2021-01-26 RX ADMIN — Medication 81 MILLIGRAM(S): at 12:18

## 2021-01-26 RX ADMIN — HEPARIN SODIUM 5000 UNIT(S): 5000 INJECTION INTRAVENOUS; SUBCUTANEOUS at 12:18

## 2021-01-26 RX ADMIN — Medication 1000 UNIT(S): at 12:18

## 2021-01-26 RX ADMIN — Medication 1 MILLIGRAM(S): at 12:18

## 2021-01-26 RX ADMIN — Medication 1 APPLICATION(S): at 05:11

## 2021-01-26 NOTE — PROGRESS NOTE ADULT - SUBJECTIVE AND OBJECTIVE BOX
CHIEF COMPLAINT:Patient is a 65y old  Male who presents with a chief complaint of Syncope and COPD exacerbation.Pt feeling better.    	  REVIEW OF SYSTEMS:  CONSTITUTIONAL: No fever, weight loss, or fatigue  EYES: No eye pain, visual disturbances, or discharge  ENT:  No difficulty hearing, tinnitus, vertigo; No sinus or throat pain  NECK: No pain or stiffness  RESPIRATORY: No cough, wheezing, chills or hemoptysis; No Shortness of Breath  CARDIOVASCULAR: No chest pain, palpitations, passing out, dizziness, or leg swelling  GASTROINTESTINAL: No abdominal or epigastric pain. No nausea, vomiting, or hematemesis; No diarrhea or constipation. No melena or hematochezia.  GENITOURINARY: No dysuria, frequency, hematuria, or incontinence  NEUROLOGICAL: No headaches, memory loss, loss of strength, numbness, or tremors  SKIN: No itching, burning, rashes, or lesions   LYMPH Nodes: No enlarged glands  ENDOCRINE: No heat or cold intolerance; No hair loss  MUSCULOSKELETAL: No joint pain or swelling; No muscle, back, or extremity pain  PSYCHIATRIC: No depression, anxiety, mood swings, or difficulty sleeping  HEME/LYMPH: No easy bruising, or bleeding gums  ALLERGY AND IMMUNOLOGIC: No hives or eczema	      PHYSICAL EXAM:  T(C): 36.6 (01-26-21 @ 04:40), Max: 36.9 (01-25-21 @ 19:20)  HR: 56 (01-26-21 @ 04:40) (56 - 79)  BP: 119/62 (01-26-21 @ 04:40) (115/65 - 146/81)  RR: 17 (01-26-21 @ 04:40) (17 - 18)  SpO2: 96% (01-26-21 @ 04:40) (95% - 97%)  Wt(kg): --  I&O's Summary      Appearance: Normal	  HEENT:   Normal oral mucosa, PERRL, EOMI	  Lymphatic: No lymphadenopathy  Cardiovascular: Normal S1 S2, No JVD, No murmurs, No edema  Respiratory: Lungs clear to auscultation	  Psychiatry: A & O x 3, Mood & affect appropriate  Gastrointestinal:  Soft, Non-tender, + BS	  Skin: No rashes, No ecchymoses, No cyanosis	  Neurologic: Non-focal  Extremities: Normal range of motion, No clubbing, cyanosis or edema  Vascular: Peripheral pulses palpable 2+ bilaterally    MEDICATIONS  (STANDING):  aspirin  chewable 81 milliGRAM(s) Oral daily  atorvastatin 20 milliGRAM(s) Oral at bedtime  budesonide 160 MICROgram(s)/formoterol 4.5 MICROgram(s) Inhaler 2 Puff(s) Inhalation two times a day  cholecalciferol 1000 Unit(s) Oral daily  DULoxetine 30 milliGRAM(s) Oral two times a day  folic acid 1 milliGRAM(s) Oral daily  gabapentin 600 milliGRAM(s) Oral three times a day  heparin   Injectable 5000 Unit(s) SubCutaneous every 8 hours  levothyroxine 25 MICROGram(s) Oral <User Schedule>  methylPREDNISolone sodium succinate Injectable 40 milliGRAM(s) IV Push two times a day  midodrine. 5 milliGRAM(s) Oral three times a day  nicotine -  14 mG/24Hr(s) Patch 1 patch Transdermal daily  OXcarbazepine 300 milliGRAM(s) Oral two times a day  pantoprazole    Tablet 40 milliGRAM(s) Oral before breakfast  QUEtiapine 100 milliGRAM(s) Oral at bedtime  tamsulosin 0.4 milliGRAM(s) Oral at bedtime  triamcinolone 0.1% Ointment 1 Application(s) Topical two times a day      	  	  LABS:	 	                      12.7   9.10  )-----------( 261      ( 25 Jan 2021 07:01 )             38.5     01-25    139  |  104  |  22<H>  ----------------------------<  161<H>  4.1   |  26  |  1.02    Ca    8.6      25 Jan 2021 07:01  Phos  2.5     01-25  Mg     2.2     01-25    TPro  6.6  /  Alb  3.9  /  TBili  x   /  DBili  x   /  AST  x   /  ALT  x   /  AlkPhos  x   01-24      Lipid Profile: Cholesterol 180  LDL --  HDL 65      HgA1c:   TSH: Thyroid Stimulating Hormone, Serum: 0.35 uU/mL (01-24 @ 06:10)

## 2021-01-26 NOTE — DISCHARGE NOTE NURSING/CASE MANAGEMENT/SOCIAL WORK - NSDCPEEMAIL_GEN_ALL_CORE
[facility] for Tobacco Control email [email or web address]

## 2021-01-26 NOTE — DISCHARGE NOTE PROVIDER - NSDCCPCAREPLAN_GEN_ALL_CORE_FT
PRINCIPAL DISCHARGE DIAGNOSIS  Diagnosis: Syncope, unspecified syncope type  Assessment and Plan of Treatment: You came with the complain of passed out. CT scan of head was negative. your cardiac enzyme was negative. Your EKG was normal. Ultrasound of the heart showed normal Ejection Fraction (normal contraction of the heart). Your orthostatic blood pressure was positive likely due to dehydration.Orthostatic hypotension is a sudden drop in blood pressure when you stand from a seated or prone (lying down) position. you were given fluids and started on midodrine 5mg three times a day to increase your blood pressure. After fluids and medication your orthostatic BP improved to normal. As your symptoms ahve been resolved, decision was made to discharge you on midodrine 50 mg three times a day . Please follow up with the PCP for further care and dose tapering of your midodrine as well as follow the instructions below.  lifestyle changes, including drinking enough water; drinking little to no alcohol; avoiding overheating; elevating the head of your bed; avoiding crossing your legs when sitting; and standing up slowly. Then pause briefly to be sure it's ok for you to start walking.      SECONDARY DISCHARGE DIAGNOSES  Diagnosis: COPD exacerbation  Assessment and Plan of Treatment: You came with difficulty in breathing. Chest Xary was cleared . you were guven 40mg of IV steroids two times a day then taper to 40mg daily to improve breathing. Continue to take 40 mg Prednisone Oral daily for two days, taper to 30mg Prednisone for 3 days and then decrease to 20mg Prednisone for 3 days and then stop. total it make 8 days from today. Continue to take your home meds . F/U with your PCP in a week after discharge.    Diagnosis: FAISAL (acute kidney injury)  Assessment and Plan of Treatment: You came with creatinine of 1.8 liklely due to dehydration improve to the normal and your base line after fluids.    Diagnosis: Hypothyroidism  Assessment and Plan of Treatment: Continue with your home med.  F/U with your PCP in a week after discharge for further care.    Diagnosis: BPH (benign prostatic hyperplasia)  Assessment and Plan of Treatment: Continue with your home med.    Diagnosis: Anxiety  Assessment and Plan of Treatment: Continue with your home med.    
Covid testing Patient in no acute distress. Discharge F/U with PMD.

## 2021-01-26 NOTE — DISCHARGE NOTE NURSING/CASE MANAGEMENT/SOCIAL WORK - PATIENT PORTAL LINK FT
You can access the FollowMyHealth Patient Portal offered by [facility] by registering at the following website: [email or web address]. By joining [employer]’s FollowMyHealth portal, you will also be able to view your health information using other applications (apps) compatible with our system.

## 2021-01-26 NOTE — DISCHARGE NOTE PROVIDER - HOSPITAL COURSE
64 y/o male from [facility], walks with walker, with PMHx of COPD, orthostatic hypotension, BPH, hypothyroid. anxiety, and depression, PSH of left hip pin fixation presents with complaint of weakness and shortness of breath. As per patient he had the covid infection previously, and had covid vaccine on Fri (2 days ago) and has been feeling short of breath and sick since.  As per patient,  he was walking with a walker, suddenly he felt dizzy and weak and laid down on the floor. He thinks he lost consciousness at some point. He heard that people was trying to wake him up, but he was too weak to respond. As per pt, he always have orthostatic hypotension, usually his SBP is 120s, and when he stands up, it drops to 80s.       On this admission. In the ED, patient  was found to be hypotensive to 74/45, temperature of 94 and sat of 90%.  pt speaking 4-5 word sentences.  s/p Duoneb Solu-medrol. US renal showed bilaterally increased renal cortical echogenicity compatible with medical renal disease. Ultrasound with  normal Ejection fraction. CT head was negative. Chest xary Clear. Trops were negative. EKG NSR.  Pt was started on Prednisone 40mg  IV BID with tapering . Currently on 40mg of Prednisone  oral daily for COPD exacerbation.  Orthostatic was positive twice  resolved after getting fluids and midodrine. Pt had FAISAL likely in the setting of dehydration resolve after IV fluids. He would be going home on Prednisone for 2 days , then 30mg for 3 days and 20mg for 3 days and stop after it.  He is also going home on midodrine 5 mg TID with tapering by his PCP.     Pt is stable and ready to be discharged to facility.

## 2021-01-26 NOTE — PROGRESS NOTE ADULT - REASON FOR ADMISSION
Syncope and COPD exacerbation

## 2021-01-26 NOTE — PROGRESS NOTE ADULT - ASSESSMENT
65 yr old male with HX of COPD,Anxiety,Depression,BPH,Orthostatic Hypotension,Hypothyroidism who presents with syncope,FAISAL,COPD exacerbation.  1.D/C Tele monitoring.  2.Orthostatic hypotension-cont midorine 5mg tid.  3.FAISAL-resolved.  4.COPD-MDI,change to po steroids,pulm f/u.  5.Hypothyroidism-synthroid.  6.Anxiety,depression-cont psych medication.  7.GI and DVT prophylaxis.  8.Smoking cessation.  9.D/C back to facility.  
65 yr old male with HX of COPD,Anxiety,Depression,BPH,Orthostatic Hypotension,Hypothyroidism who presents with syncope,FAISAL,COPD exacerbation.  1.Tele monitoring.  2.Orthostatic hypotension-add midorine 5mg tid.Check AM cortisol.  3.FAISAL-s/p IVF, resolved.  4.COPD-MDI,steroids,pulm f/u.  5.Hypothyroidism-synthroid.  6.Anxiety,depression-cont psych medication.  7.GI and DVT prophylaxis.  8.Smoking cessation.  
Assessment and Recommendation:   Problem/Recommendation - 1:  Problem: COPD exacerbation. Recommendation: oxygen supp  bronchodilators  Steroids - PO taper.   Spiriva  Pfts as OP  smoking cessation.  D/C planning.     Problem/Recommendation - 2:  ·  Problem: Syncope, unspecified syncope type.  Recommendation: Tele monitoring  Echo noted   Carotid doppler noted  CT head noted   Neuro/Cardio F/U     Problem/Recommendation - 3:  ·  Problem: BPH (benign prostatic hyperplasia).  Recommendation: cont meds  Renal US noted.    follow up as OP. 
Assessment and Recommendation:   Problem/Recommendation - 1:  Problem: COPD exacerbation. Recommendation: oxygen supp  bronchodilators  steroids  spiriva  Pfts as OP  smoking cessation.    Problem/Recommendation - 2:  ·  Problem: Syncope, unspecified syncope type.  Recommendation: Tele monitoring  Echo noted   Carotid doppler noted  CT head noted   Neuro/Cardio F/U     Problem/Recommendation - 3:  ·  Problem: BPH (benign prostatic hyperplasia).  Recommendation: cont meds   follow up as OP. 
64 y/o male from [facility], walks with walker, with PMHx of COPD, orthostatic hypotension, BPH, hypothyroid. anxiety, and depression, PSH of left hip pin fixation presents with complaint of weakness and shortness of breath. Pt was admitted for COPD exacerbation and syncope work up.       In the ED, patient  was found to be hypotensive to 74/45, temperature of 94 and sat of 90%.  pt speaking 4-5 word sentences.  s/p duoneb, solu-medrol 125mg    GOC: FULL CODE

## 2021-01-26 NOTE — DISCHARGE NOTE NURSING/CASE MANAGEMENT/SOCIAL WORK - NSDCPEWEB_GEN_ALL_CORE
[facility] for Tobacco Control website --- [email or web address] website --- [email or web address]

## 2021-01-26 NOTE — PROGRESS NOTE ADULT - SUBJECTIVE AND OBJECTIVE BOX
Patient is a 65y old  Male who presents with a chief complaint of Syncope and COPD exacerbation (26 Jan 2021 10:11)      INTERVAL HPI/OVERNIGHT EVENTS:      VITAL SIGNS:  T(F): 98.1 (01-26-21 @ 11:27)  HR: 64 (01-26-21 @ 11:27)  BP: 136/73 (01-26-21 @ 11:27)  RR: 18 (01-26-21 @ 11:27)  SpO2: 97% (01-26-21 @ 11:27)  Wt(kg): --  I&O's Detail          REVIEW OF SYSTEMS:    CONSTITUTIONAL:  No fevers, chills, sweats    HEENT:  Eyes:  No diplopia or blurred vision. ENT:  No earache, sore throat or runny nose.    CARDIOVASCULAR:  No pressure, squeezing, tightness, or heaviness about the chest; no palpitations.    RESPIRATORY:  Per HPI    GASTROINTESTINAL:  No abdominal pain, nausea, vomiting or diarrhea.    GENITOURINARY:  No dysuria, frequency or urgency.    NEUROLOGIC:  No paresthesias, fasciculations, seizures or weakness.    PSYCHIATRIC:  No disorder of thought or mood.      PHYSICAL EXAM:    Constitutional: Well developed and nourished  Eyes:Perrla  ENMT: normal  Neck:supple  Respiratory: good air entry  Cardiovascular: S1 S2 regular  Gastrointestinal: Soft, Non tender  Extremities: No edema  Vascular:normal  Neurological:Awake, alert,Ox3  Musculoskeletal:Normal      MEDICATIONS  (STANDING):  aspirin  chewable 81 milliGRAM(s) Oral daily  atorvastatin 20 milliGRAM(s) Oral at bedtime  budesonide 160 MICROgram(s)/formoterol 4.5 MICROgram(s) Inhaler 2 Puff(s) Inhalation two times a day  cholecalciferol 1000 Unit(s) Oral daily  DULoxetine 30 milliGRAM(s) Oral two times a day  folic acid 1 milliGRAM(s) Oral daily  gabapentin 600 milliGRAM(s) Oral three times a day  heparin   Injectable 5000 Unit(s) SubCutaneous every 8 hours  levothyroxine 25 MICROGram(s) Oral <User Schedule>  melatonin 3 milliGRAM(s) Oral at bedtime  midodrine. 5 milliGRAM(s) Oral three times a day  nicotine -  14 mG/24Hr(s) Patch 1 patch Transdermal daily  OXcarbazepine 300 milliGRAM(s) Oral two times a day  pantoprazole    Tablet 40 milliGRAM(s) Oral before breakfast  predniSONE   Tablet 40 milliGRAM(s) Oral daily  QUEtiapine 100 milliGRAM(s) Oral at bedtime  tamsulosin 0.4 milliGRAM(s) Oral at bedtime  triamcinolone 0.1% Ointment 1 Application(s) Topical two times a day    MEDICATIONS  (PRN):  acetaminophen   Tablet .. 650 milliGRAM(s) Oral every 6 hours PRN Mild Pain (1 - 3)  ALBUTerol    90 MICROgram(s) HFA Inhaler 2 Puff(s) Inhalation every 6 hours PRN Shortness of Breath and/or Wheezing  lidocaine 2% Viscous 15 milliLiter(s) Swish and Spit every 3 hours PRN Mouth Care  oxyCODONE    IR 15 milliGRAM(s) Oral every 8 hours PRN Severe Pain (7 - 10)      Allergies    No Known Allergies    Intolerances        LABS:                        12.7   9.10  )-----------( 261      ( 25 Jan 2021 07:01 )             38.5     01-25    139  |  104  |  22<H>  ----------------------------<  161<H>  4.1   |  26  |  1.02    Ca    8.6      25 Jan 2021 07:01  Phos  2.5     01-25  Mg     2.2     01-25    TPro  6.6  /  Alb  3.9  /  TBili  x   /  DBili  x   /  AST  x   /  ALT  x   /  AlkPhos  x   01-24      CAPILLARY BLOOD GLUCOSE    RADIOLOGY & ADDITIONAL TESTS:    CXR:    < from: US Renal (01.25.21 @ 12:53) >  Bilaterally increased renal cortical echogenicity compatible with medical renal disease.  No hydronephrosis.    < end of copied text >    Ct scan chest:  < from: CT Head No Cont (01.24.21 @ 08:35) >  IMPRESSION:    No acute intracranial findings.    < end of copied text >    ekg;    echo:

## 2021-02-05 LAB — METHYLMALONATE SERPL-SCNC: 196 NMOL/L — SIGNIFICANT CHANGE UP (ref 0–378)

## 2022-04-04 ENCOUNTER — INPATIENT (INPATIENT)
Facility: HOSPITAL | Age: 67
LOS: 3 days | Discharge: NURSING, REHAB OR LONG-TERM CARE | DRG: 871 | End: 2022-04-08
Attending: INTERNAL MEDICINE | Admitting: INTERNAL MEDICINE
Payer: MEDICARE

## 2022-04-04 VITALS
HEIGHT: 72 IN | OXYGEN SATURATION: 100 % | DIASTOLIC BLOOD PRESSURE: 51 MMHG | RESPIRATION RATE: 24 BRPM | SYSTOLIC BLOOD PRESSURE: 89 MMHG | HEART RATE: 57 BPM | WEIGHT: 199.74 LBS

## 2022-04-04 LAB
ALBUMIN SERPL ELPH-MCNC: 3 G/DL — LOW (ref 3.5–5)
ALP SERPL-CCNC: 61 U/L — SIGNIFICANT CHANGE UP (ref 40–120)
ALT FLD-CCNC: 18 U/L DA — SIGNIFICANT CHANGE UP (ref 10–60)
ANION GAP SERPL CALC-SCNC: 3 MMOL/L — LOW (ref 5–17)
APTT BLD: 32.9 SEC — SIGNIFICANT CHANGE UP (ref 27.5–35.5)
AST SERPL-CCNC: 21 U/L — SIGNIFICANT CHANGE UP (ref 10–40)
BASE EXCESS BLDV CALC-SCNC: 0.4 MMOL/L — SIGNIFICANT CHANGE UP
BASOPHILS # BLD AUTO: 0.05 K/UL — SIGNIFICANT CHANGE UP (ref 0–0.2)
BASOPHILS NFR BLD AUTO: 0.8 % — SIGNIFICANT CHANGE UP (ref 0–2)
BILIRUB SERPL-MCNC: 0.4 MG/DL — SIGNIFICANT CHANGE UP (ref 0.2–1.2)
BLD GP AB SCN SERPL QL: SIGNIFICANT CHANGE UP
BLOOD GAS COMMENTS, VENOUS: SIGNIFICANT CHANGE UP
BUN SERPL-MCNC: 16 MG/DL — SIGNIFICANT CHANGE UP (ref 7–18)
CALCIUM SERPL-MCNC: 8 MG/DL — LOW (ref 8.4–10.5)
CHLORIDE SERPL-SCNC: 110 MMOL/L — HIGH (ref 96–108)
CO2 SERPL-SCNC: 28 MMOL/L — SIGNIFICANT CHANGE UP (ref 22–31)
CREAT SERPL-MCNC: 1.38 MG/DL — HIGH (ref 0.5–1.3)
EGFR: 57 ML/MIN/1.73M2 — LOW
EOSINOPHIL # BLD AUTO: 0.7 K/UL — HIGH (ref 0–0.5)
EOSINOPHIL NFR BLD AUTO: 11.2 % — HIGH (ref 0–6)
FLUAV AG NPH QL: SIGNIFICANT CHANGE UP
FLUBV AG NPH QL: SIGNIFICANT CHANGE UP
GLUCOSE SERPL-MCNC: 90 MG/DL — SIGNIFICANT CHANGE UP (ref 70–99)
HCO3 BLDV-SCNC: 28 MMOL/L — SIGNIFICANT CHANGE UP (ref 22–29)
HCT VFR BLD CALC: 33.1 % — LOW (ref 39–50)
HGB BLD-MCNC: 11 G/DL — LOW (ref 13–17)
IMM GRANULOCYTES NFR BLD AUTO: 0.3 % — SIGNIFICANT CHANGE UP (ref 0–1.5)
INR BLD: 1.02 RATIO — SIGNIFICANT CHANGE UP (ref 0.88–1.16)
LACTATE SERPL-SCNC: 1.2 MMOL/L — SIGNIFICANT CHANGE UP (ref 0.7–2)
LIDOCAIN IGE QN: 888 U/L — HIGH (ref 73–393)
LYMPHOCYTES # BLD AUTO: 1.74 K/UL — SIGNIFICANT CHANGE UP (ref 1–3.3)
LYMPHOCYTES # BLD AUTO: 27.8 % — SIGNIFICANT CHANGE UP (ref 13–44)
MAGNESIUM SERPL-MCNC: 1.9 MG/DL — SIGNIFICANT CHANGE UP (ref 1.6–2.6)
MCHC RBC-ENTMCNC: 33.2 GM/DL — SIGNIFICANT CHANGE UP (ref 32–36)
MCHC RBC-ENTMCNC: 34 PG — SIGNIFICANT CHANGE UP (ref 27–34)
MCV RBC AUTO: 102.2 FL — HIGH (ref 80–100)
MONOCYTES # BLD AUTO: 0.87 K/UL — SIGNIFICANT CHANGE UP (ref 0–0.9)
MONOCYTES NFR BLD AUTO: 13.9 % — SIGNIFICANT CHANGE UP (ref 2–14)
NEUTROPHILS # BLD AUTO: 2.87 K/UL — SIGNIFICANT CHANGE UP (ref 1.8–7.4)
NEUTROPHILS NFR BLD AUTO: 46 % — SIGNIFICANT CHANGE UP (ref 43–77)
NRBC # BLD: 0 /100 WBCS — SIGNIFICANT CHANGE UP (ref 0–0)
NT-PROBNP SERPL-SCNC: 678 PG/ML — HIGH (ref 0–125)
PCO2 BLDV: 55 MMHG — SIGNIFICANT CHANGE UP (ref 42–55)
PH BLDV: 7.31 — LOW (ref 7.32–7.43)
PLATELET # BLD AUTO: 250 K/UL — SIGNIFICANT CHANGE UP (ref 150–400)
PO2 BLDV: 34 MMHG — SIGNIFICANT CHANGE UP
POTASSIUM SERPL-MCNC: 4.5 MMOL/L — SIGNIFICANT CHANGE UP (ref 3.5–5.3)
POTASSIUM SERPL-SCNC: 4.5 MMOL/L — SIGNIFICANT CHANGE UP (ref 3.5–5.3)
PROT SERPL-MCNC: 5.7 G/DL — LOW (ref 6–8.3)
PROTHROM AB SERPL-ACNC: 12.2 SEC — SIGNIFICANT CHANGE UP (ref 10.5–13.4)
RBC # BLD: 3.24 M/UL — LOW (ref 4.2–5.8)
RBC # FLD: 13.5 % — SIGNIFICANT CHANGE UP (ref 10.3–14.5)
SAO2 % BLDV: 51.6 % — SIGNIFICANT CHANGE UP
SARS-COV-2 RNA SPEC QL NAA+PROBE: SIGNIFICANT CHANGE UP
SODIUM SERPL-SCNC: 141 MMOL/L — SIGNIFICANT CHANGE UP (ref 135–145)
TROPONIN I, HIGH SENSITIVITY RESULT: 9.7 NG/L — SIGNIFICANT CHANGE UP
WBC # BLD: 6.25 K/UL — SIGNIFICANT CHANGE UP (ref 3.8–10.5)
WBC # FLD AUTO: 6.25 K/UL — SIGNIFICANT CHANGE UP (ref 3.8–10.5)

## 2022-04-04 PROCEDURE — 93010 ELECTROCARDIOGRAM REPORT: CPT

## 2022-04-04 PROCEDURE — 71045 X-RAY EXAM CHEST 1 VIEW: CPT | Mod: 26

## 2022-04-04 PROCEDURE — 99285 EMERGENCY DEPT VISIT HI MDM: CPT

## 2022-04-04 RX ORDER — NALOXONE HYDROCHLORIDE 4 MG/.1ML
1 SPRAY NASAL ONCE
Refills: 0 | Status: COMPLETED | OUTPATIENT
Start: 2022-04-04 | End: 2022-04-04

## 2022-04-04 RX ORDER — FUROSEMIDE 40 MG
40 TABLET ORAL ONCE
Refills: 0 | Status: COMPLETED | OUTPATIENT
Start: 2022-04-04 | End: 2022-04-04

## 2022-04-04 RX ORDER — DIPHENHYDRAMINE HCL 50 MG
25 CAPSULE ORAL ONCE
Refills: 0 | Status: COMPLETED | OUTPATIENT
Start: 2022-04-04 | End: 2022-04-04

## 2022-04-04 RX ADMIN — NALOXONE HYDROCHLORIDE 1 MILLIGRAM(S): 4 SPRAY NASAL at 22:56

## 2022-04-04 NOTE — ED ADULT TRIAGE NOTE - CHIEF COMPLAINT QUOTE
brought in by MAR[facility] from assisted living as notification for hypotension & syncope episode. patient passed in the chair x 20 min prior to arrival. NS ivpb 400cc bolus given.

## 2022-04-04 NOTE — ED PROVIDER NOTE - OBJECTIVE STATEMENT
66M, pmh of HTN, DM, COPD, bipolar disorder sent to the ED for unresponsiveness. history obtained from EMS at bedside. patient was speaking with another rmember of the assisted living facility 66M, pmh of HTN, DM, COPD, bipolar disorder sent to the ED for unresponsiveness. history obtained from EMS at bedside. patient was speaking with another member of the assisted living facility when he suddenly lost consciousness. was unresponsive when EMS arrived and was hypotensive. responded slightly to pain. GCS 3 on notification call.

## 2022-04-04 NOTE — ED PROVIDER NOTE - PROGRESS NOTE DETAILS
patient given narcarn. mental status and BP improved, but became very tremulousness and was audibly wheezing on exam and complaining that he could not breathe. placed back on NRB. initially given benadryl and ativan to try and facilitate CT scan, but patient likely in opioid withdrawal with pulmonary edema from narcan. will admit. mariajose Springer

## 2022-04-04 NOTE — ED PROVIDER NOTE - CLINICAL SUMMARY MEDICAL DECISION MAKING FREE TEXT BOX
66M presenting with AMS. patient responding to verbal stimuli on arrival to ED and moving all extremities spontaneously. GCS 15. patient unable to provide further history. BP improving on ED arrival with administration of fluids by EMS. drops to 90% on RA but 95-96% on NC. patient denies taking any extra doses of his medications. bedside US had suboptimal views, but no large pericardial effusion, all walls moving equally, no RV collapse. will get labs and send urgently to CT to r/o dissection, PE. sister updated on patient's status.

## 2022-04-04 NOTE — ED PROVIDER NOTE - PHYSICAL EXAMINATION
General: lethargic appearing male, no acute distress   HEENT: normocephalic, atraumatic, PERRL  Respiratory: normal work of breathing, lungs clear to auscultation bilaterally   Cardiac: regular rate and rhythm   Abdomen: soft, non-tender, no guarding or rebound   MSK: no swelling or tenderness of lower extremities, moving all extremities spontaneously   Skin: warm, dry   Neuro: A&Ox2  Psych: appropriate affect

## 2022-04-05 DIAGNOSIS — Z29.9 ENCOUNTER FOR PROPHYLACTIC MEASURES, UNSPECIFIED: ICD-10-CM

## 2022-04-05 DIAGNOSIS — I10 ESSENTIAL (PRIMARY) HYPERTENSION: ICD-10-CM

## 2022-04-05 DIAGNOSIS — J44.9 CHRONIC OBSTRUCTIVE PULMONARY DISEASE, UNSPECIFIED: ICD-10-CM

## 2022-04-05 DIAGNOSIS — R33.9 RETENTION OF URINE, UNSPECIFIED: ICD-10-CM

## 2022-04-05 DIAGNOSIS — R41.89 OTHER SYMPTOMS AND SIGNS INVOLVING COGNITIVE FUNCTIONS AND AWARENESS: ICD-10-CM

## 2022-04-05 DIAGNOSIS — R41.82 ALTERED MENTAL STATUS, UNSPECIFIED: ICD-10-CM

## 2022-04-05 DIAGNOSIS — F41.9 ANXIETY DISORDER, UNSPECIFIED: ICD-10-CM

## 2022-04-05 PROBLEM — F17.200 NICOTINE DEPENDENCE, UNSPECIFIED, UNCOMPLICATED: Chronic | Status: ACTIVE | Noted: 2021-01-24

## 2022-04-05 LAB
A1C WITH ESTIMATED AVERAGE GLUCOSE RESULT: 6 % — HIGH (ref 4–5.6)
ALBUMIN SERPL ELPH-MCNC: 3.1 G/DL — LOW (ref 3.5–5)
ALP SERPL-CCNC: 63 U/L — SIGNIFICANT CHANGE UP (ref 40–120)
ALT FLD-CCNC: 18 U/L DA — SIGNIFICANT CHANGE UP (ref 10–60)
ANION GAP SERPL CALC-SCNC: 1 MMOL/L — LOW (ref 5–17)
APPEARANCE UR: CLEAR — SIGNIFICANT CHANGE UP
AST SERPL-CCNC: 21 U/L — SIGNIFICANT CHANGE UP (ref 10–40)
BASOPHILS # BLD AUTO: 0.04 K/UL — SIGNIFICANT CHANGE UP (ref 0–0.2)
BASOPHILS NFR BLD AUTO: 0.6 % — SIGNIFICANT CHANGE UP (ref 0–2)
BILIRUB SERPL-MCNC: 0.4 MG/DL — SIGNIFICANT CHANGE UP (ref 0.2–1.2)
BILIRUB UR-MCNC: NEGATIVE — SIGNIFICANT CHANGE UP
BUN SERPL-MCNC: 15 MG/DL — SIGNIFICANT CHANGE UP (ref 7–18)
CALCIUM SERPL-MCNC: 8.6 MG/DL — SIGNIFICANT CHANGE UP (ref 8.4–10.5)
CHLORIDE SERPL-SCNC: 110 MMOL/L — HIGH (ref 96–108)
CHOLEST SERPL-MCNC: 143 MG/DL — SIGNIFICANT CHANGE UP
CO2 SERPL-SCNC: 30 MMOL/L — SIGNIFICANT CHANGE UP (ref 22–31)
COLOR SPEC: YELLOW — SIGNIFICANT CHANGE UP
CREAT SERPL-MCNC: 1.24 MG/DL — SIGNIFICANT CHANGE UP (ref 0.5–1.3)
DIFF PNL FLD: NEGATIVE — SIGNIFICANT CHANGE UP
EGFR: 65 ML/MIN/1.73M2 — SIGNIFICANT CHANGE UP
EOSINOPHIL # BLD AUTO: 0.72 K/UL — HIGH (ref 0–0.5)
EOSINOPHIL NFR BLD AUTO: 11.4 % — HIGH (ref 0–6)
ESTIMATED AVERAGE GLUCOSE: 126 MG/DL — HIGH (ref 68–114)
GLUCOSE SERPL-MCNC: 116 MG/DL — HIGH (ref 70–99)
GLUCOSE UR QL: NEGATIVE — SIGNIFICANT CHANGE UP
HCT VFR BLD CALC: 34.6 % — LOW (ref 39–50)
HDLC SERPL-MCNC: 44 MG/DL — SIGNIFICANT CHANGE UP
HGB BLD-MCNC: 11.4 G/DL — LOW (ref 13–17)
IMM GRANULOCYTES NFR BLD AUTO: 0.5 % — SIGNIFICANT CHANGE UP (ref 0–1.5)
KETONES UR-MCNC: NEGATIVE — SIGNIFICANT CHANGE UP
LEUKOCYTE ESTERASE UR-ACNC: NEGATIVE — SIGNIFICANT CHANGE UP
LIDOCAIN IGE QN: 312 U/L — SIGNIFICANT CHANGE UP (ref 73–393)
LIPID PNL WITH DIRECT LDL SERPL: 81 MG/DL — SIGNIFICANT CHANGE UP
LYMPHOCYTES # BLD AUTO: 1.35 K/UL — SIGNIFICANT CHANGE UP (ref 1–3.3)
LYMPHOCYTES # BLD AUTO: 21.4 % — SIGNIFICANT CHANGE UP (ref 13–44)
MAGNESIUM SERPL-MCNC: 2 MG/DL — SIGNIFICANT CHANGE UP (ref 1.6–2.6)
MCHC RBC-ENTMCNC: 32.9 GM/DL — SIGNIFICANT CHANGE UP (ref 32–36)
MCHC RBC-ENTMCNC: 34.1 PG — HIGH (ref 27–34)
MCV RBC AUTO: 103.6 FL — HIGH (ref 80–100)
MONOCYTES # BLD AUTO: 1.04 K/UL — HIGH (ref 0–0.9)
MONOCYTES NFR BLD AUTO: 16.5 % — HIGH (ref 2–14)
NEUTROPHILS # BLD AUTO: 3.12 K/UL — SIGNIFICANT CHANGE UP (ref 1.8–7.4)
NEUTROPHILS NFR BLD AUTO: 49.6 % — SIGNIFICANT CHANGE UP (ref 43–77)
NITRITE UR-MCNC: NEGATIVE — SIGNIFICANT CHANGE UP
NON HDL CHOLESTEROL: 99 MG/DL — SIGNIFICANT CHANGE UP
NRBC # BLD: 0 /100 WBCS — SIGNIFICANT CHANGE UP (ref 0–0)
PH UR: 7 — SIGNIFICANT CHANGE UP (ref 5–8)
PHOSPHATE SERPL-MCNC: 3.6 MG/DL — SIGNIFICANT CHANGE UP (ref 2.5–4.5)
PLATELET # BLD AUTO: 242 K/UL — SIGNIFICANT CHANGE UP (ref 150–400)
POTASSIUM SERPL-MCNC: 4 MMOL/L — SIGNIFICANT CHANGE UP (ref 3.5–5.3)
POTASSIUM SERPL-MCNC: 5.4 MMOL/L — HIGH (ref 3.5–5.3)
POTASSIUM SERPL-SCNC: 4 MMOL/L — SIGNIFICANT CHANGE UP (ref 3.5–5.3)
POTASSIUM SERPL-SCNC: 5.4 MMOL/L — HIGH (ref 3.5–5.3)
PROT SERPL-MCNC: 6.1 G/DL — SIGNIFICANT CHANGE UP (ref 6–8.3)
PROT UR-MCNC: NEGATIVE — SIGNIFICANT CHANGE UP
RBC # BLD: 3.34 M/UL — LOW (ref 4.2–5.8)
RBC # FLD: 13.9 % — SIGNIFICANT CHANGE UP (ref 10.3–14.5)
SODIUM SERPL-SCNC: 141 MMOL/L — SIGNIFICANT CHANGE UP (ref 135–145)
SP GR SPEC: 1 — LOW (ref 1.01–1.02)
TRIGL SERPL-MCNC: 92 MG/DL — SIGNIFICANT CHANGE UP
TSH SERPL-MCNC: 0.42 UU/ML — SIGNIFICANT CHANGE UP (ref 0.34–4.82)
UROBILINOGEN FLD QL: NEGATIVE — SIGNIFICANT CHANGE UP
WBC # BLD: 6.3 K/UL — SIGNIFICANT CHANGE UP (ref 3.8–10.5)
WBC # FLD AUTO: 6.3 K/UL — SIGNIFICANT CHANGE UP (ref 3.8–10.5)

## 2022-04-05 PROCEDURE — 70450 CT HEAD/BRAIN W/O DYE: CPT | Mod: 26

## 2022-04-05 PROCEDURE — 71250 CT THORAX DX C-: CPT | Mod: 26

## 2022-04-05 PROCEDURE — 74176 CT ABD & PELVIS W/O CONTRAST: CPT | Mod: 26

## 2022-04-05 RX ORDER — BUDESONIDE AND FORMOTEROL FUMARATE DIHYDRATE 160; 4.5 UG/1; UG/1
2 AEROSOL RESPIRATORY (INHALATION)
Qty: 0 | Refills: 0 | DISCHARGE

## 2022-04-05 RX ORDER — BUDESONIDE AND FORMOTEROL FUMARATE DIHYDRATE 160; 4.5 UG/1; UG/1
2 AEROSOL RESPIRATORY (INHALATION)
Refills: 0 | Status: DISCONTINUED | OUTPATIENT
Start: 2022-04-05 | End: 2022-04-08

## 2022-04-05 RX ORDER — TAMSULOSIN HYDROCHLORIDE 0.4 MG/1
1 CAPSULE ORAL
Qty: 0 | Refills: 0 | DISCHARGE

## 2022-04-05 RX ORDER — AZITHROMYCIN 500 MG/1
500 TABLET, FILM COATED ORAL EVERY 24 HOURS
Refills: 0 | Status: DISCONTINUED | OUTPATIENT
Start: 2022-04-06 | End: 2022-04-08

## 2022-04-05 RX ORDER — IPRATROPIUM/ALBUTEROL SULFATE 18-103MCG
3 AEROSOL WITH ADAPTER (GRAM) INHALATION
Qty: 0 | Refills: 0 | DISCHARGE

## 2022-04-05 RX ORDER — AZITHROMYCIN 500 MG/1
TABLET, FILM COATED ORAL
Refills: 0 | Status: DISCONTINUED | OUTPATIENT
Start: 2022-04-05 | End: 2022-04-08

## 2022-04-05 RX ORDER — GABAPENTIN 400 MG/1
600 CAPSULE ORAL THREE TIMES A DAY
Refills: 0 | Status: DISCONTINUED | OUTPATIENT
Start: 2022-04-05 | End: 2022-04-08

## 2022-04-05 RX ORDER — ERGOCALCIFEROL 1.25 MG/1
1 CAPSULE ORAL
Qty: 0 | Refills: 0 | DISCHARGE

## 2022-04-05 RX ORDER — ATORVASTATIN CALCIUM 80 MG/1
20 TABLET, FILM COATED ORAL AT BEDTIME
Refills: 0 | Status: DISCONTINUED | OUTPATIENT
Start: 2022-04-05 | End: 2022-04-08

## 2022-04-05 RX ORDER — DULOXETINE HYDROCHLORIDE 30 MG/1
30 CAPSULE, DELAYED RELEASE ORAL
Refills: 0 | Status: DISCONTINUED | OUTPATIENT
Start: 2022-04-05 | End: 2022-04-08

## 2022-04-05 RX ORDER — FINASTERIDE 5 MG/1
5 TABLET, FILM COATED ORAL DAILY
Refills: 0 | Status: DISCONTINUED | OUTPATIENT
Start: 2022-04-05 | End: 2022-04-08

## 2022-04-05 RX ORDER — QUETIAPINE FUMARATE 200 MG/1
1 TABLET, FILM COATED ORAL
Qty: 0 | Refills: 0 | DISCHARGE

## 2022-04-05 RX ORDER — SODIUM ZIRCONIUM CYCLOSILICATE 10 G/10G
10 POWDER, FOR SUSPENSION ORAL ONCE
Refills: 0 | Status: DISCONTINUED | OUTPATIENT
Start: 2022-04-05 | End: 2022-04-05

## 2022-04-05 RX ORDER — BACLOFEN 100 %
1 POWDER (GRAM) MISCELLANEOUS
Qty: 0 | Refills: 0 | DISCHARGE

## 2022-04-05 RX ORDER — OXCARBAZEPINE 300 MG/1
300 TABLET, FILM COATED ORAL
Refills: 0 | Status: DISCONTINUED | OUTPATIENT
Start: 2022-04-05 | End: 2022-04-08

## 2022-04-05 RX ORDER — QUETIAPINE FUMARATE 200 MG/1
100 TABLET, FILM COATED ORAL AT BEDTIME
Refills: 0 | Status: DISCONTINUED | OUTPATIENT
Start: 2022-04-05 | End: 2022-04-08

## 2022-04-05 RX ORDER — POLYETHYLENE GLYCOL 3350 17 G/17G
17 POWDER, FOR SOLUTION ORAL DAILY
Refills: 0 | Status: DISCONTINUED | OUTPATIENT
Start: 2022-04-05 | End: 2022-04-08

## 2022-04-05 RX ORDER — ASPIRIN/CALCIUM CARB/MAGNESIUM 324 MG
1 TABLET ORAL
Qty: 0 | Refills: 0 | DISCHARGE

## 2022-04-05 RX ORDER — FLUTICASONE FUROATE, UMECLIDINIUM BROMIDE AND VILANTEROL TRIFENATATE 200; 62.5; 25 UG/1; UG/1; UG/1
1 POWDER RESPIRATORY (INHALATION)
Qty: 0 | Refills: 0 | DISCHARGE

## 2022-04-05 RX ORDER — FAMOTIDINE 10 MG/ML
1 INJECTION INTRAVENOUS
Qty: 0 | Refills: 0 | DISCHARGE

## 2022-04-05 RX ORDER — OXYCODONE HYDROCHLORIDE 5 MG/1
15 TABLET ORAL THREE TIMES A DAY
Refills: 0 | Status: DISCONTINUED | OUTPATIENT
Start: 2022-04-05 | End: 2022-04-08

## 2022-04-05 RX ORDER — SODIUM CHLORIDE 9 MG/ML
1000 INJECTION, SOLUTION INTRAVENOUS
Refills: 0 | Status: DISCONTINUED | OUTPATIENT
Start: 2022-04-05 | End: 2022-04-08

## 2022-04-05 RX ORDER — AZITHROMYCIN 500 MG/1
500 TABLET, FILM COATED ORAL ONCE
Refills: 0 | Status: COMPLETED | OUTPATIENT
Start: 2022-04-05 | End: 2022-04-05

## 2022-04-05 RX ORDER — TAMSULOSIN HYDROCHLORIDE 0.4 MG/1
0.4 CAPSULE ORAL AT BEDTIME
Refills: 0 | Status: DISCONTINUED | OUTPATIENT
Start: 2022-04-05 | End: 2022-04-05

## 2022-04-05 RX ORDER — DULOXETINE HYDROCHLORIDE 30 MG/1
1 CAPSULE, DELAYED RELEASE ORAL
Qty: 0 | Refills: 0 | DISCHARGE

## 2022-04-05 RX ORDER — MONTELUKAST 4 MG/1
1 TABLET, CHEWABLE ORAL
Qty: 0 | Refills: 0 | DISCHARGE

## 2022-04-05 RX ORDER — ASPIRIN/CALCIUM CARB/MAGNESIUM 324 MG
81 TABLET ORAL DAILY
Refills: 0 | Status: DISCONTINUED | OUTPATIENT
Start: 2022-04-05 | End: 2022-04-08

## 2022-04-05 RX ORDER — NICOTINE POLACRILEX 2 MG
1 GUM BUCCAL
Qty: 0 | Refills: 0 | DISCHARGE

## 2022-04-05 RX ORDER — MAGNESIUM HYDROXIDE 400 MG/1
15 TABLET, CHEWABLE ORAL DAILY
Refills: 0 | Status: DISCONTINUED | OUTPATIENT
Start: 2022-04-05 | End: 2022-04-05

## 2022-04-05 RX ORDER — OXCARBAZEPINE 300 MG/1
1 TABLET, FILM COATED ORAL
Qty: 0 | Refills: 0 | DISCHARGE

## 2022-04-05 RX ORDER — MULTIVIT WITH MIN/MFOLATE/K2 340-15/3 G
1 POWDER (GRAM) ORAL ONCE
Refills: 0 | Status: COMPLETED | OUTPATIENT
Start: 2022-04-05 | End: 2022-04-05

## 2022-04-05 RX ORDER — ATORVASTATIN CALCIUM 80 MG/1
1 TABLET, FILM COATED ORAL
Qty: 0 | Refills: 0 | DISCHARGE

## 2022-04-05 RX ORDER — MONTELUKAST 4 MG/1
10 TABLET, CHEWABLE ORAL DAILY
Refills: 0 | Status: DISCONTINUED | OUTPATIENT
Start: 2022-04-05 | End: 2022-04-08

## 2022-04-05 RX ORDER — HEPARIN SODIUM 5000 [USP'U]/ML
5000 INJECTION INTRAVENOUS; SUBCUTANEOUS EVERY 8 HOURS
Refills: 0 | Status: DISCONTINUED | OUTPATIENT
Start: 2022-04-05 | End: 2022-04-08

## 2022-04-05 RX ORDER — CEFTRIAXONE 500 MG/1
1000 INJECTION, POWDER, FOR SOLUTION INTRAMUSCULAR; INTRAVENOUS EVERY 24 HOURS
Refills: 0 | Status: DISCONTINUED | OUTPATIENT
Start: 2022-04-05 | End: 2022-04-08

## 2022-04-05 RX ORDER — MAGNESIUM HYDROXIDE 400 MG/1
15 TABLET, CHEWABLE ORAL
Qty: 0 | Refills: 0 | DISCHARGE

## 2022-04-05 RX ORDER — LEVOTHYROXINE SODIUM 125 MCG
1 TABLET ORAL
Qty: 0 | Refills: 0 | DISCHARGE

## 2022-04-05 RX ORDER — OMEPRAZOLE 10 MG/1
1 CAPSULE, DELAYED RELEASE ORAL
Qty: 0 | Refills: 0 | DISCHARGE

## 2022-04-05 RX ORDER — SENNA PLUS 8.6 MG/1
2 TABLET ORAL AT BEDTIME
Refills: 0 | Status: DISCONTINUED | OUTPATIENT
Start: 2022-04-05 | End: 2022-04-08

## 2022-04-05 RX ORDER — TAMSULOSIN HYDROCHLORIDE 0.4 MG/1
0.4 CAPSULE ORAL
Refills: 0 | Status: DISCONTINUED | OUTPATIENT
Start: 2022-04-05 | End: 2022-04-08

## 2022-04-05 RX ORDER — GABAPENTIN 400 MG/1
2 CAPSULE ORAL
Qty: 0 | Refills: 0 | DISCHARGE

## 2022-04-05 RX ORDER — ALBUTEROL 90 UG/1
2 AEROSOL, METERED ORAL
Qty: 0 | Refills: 0 | DISCHARGE

## 2022-04-05 RX ORDER — SODIUM ZIRCONIUM CYCLOSILICATE 10 G/10G
5 POWDER, FOR SUSPENSION ORAL ONCE
Refills: 0 | Status: COMPLETED | OUTPATIENT
Start: 2022-04-05 | End: 2022-04-05

## 2022-04-05 RX ORDER — ALBUTEROL 90 UG/1
2 AEROSOL, METERED ORAL EVERY 6 HOURS
Refills: 0 | Status: DISCONTINUED | OUTPATIENT
Start: 2022-04-05 | End: 2022-04-08

## 2022-04-05 RX ORDER — MOMETASONE FUROATE 1 MG/G
1 CREAM TOPICAL
Qty: 0 | Refills: 0 | DISCHARGE

## 2022-04-05 RX ORDER — BACLOFEN 100 %
10 POWDER (GRAM) MISCELLANEOUS
Refills: 0 | Status: DISCONTINUED | OUTPATIENT
Start: 2022-04-05 | End: 2022-04-08

## 2022-04-05 RX ORDER — LIDOCAINE 4 G/100G
15 CREAM TOPICAL
Qty: 0 | Refills: 0 | DISCHARGE

## 2022-04-05 RX ORDER — LEVOTHYROXINE SODIUM 125 MCG
25 TABLET ORAL DAILY
Refills: 0 | Status: DISCONTINUED | OUTPATIENT
Start: 2022-04-05 | End: 2022-04-08

## 2022-04-05 RX ORDER — OXYCODONE HYDROCHLORIDE 5 MG/1
3 TABLET ORAL
Qty: 0 | Refills: 0 | DISCHARGE

## 2022-04-05 RX ORDER — FAMOTIDINE 10 MG/ML
20 INJECTION INTRAVENOUS
Refills: 0 | Status: DISCONTINUED | OUTPATIENT
Start: 2022-04-05 | End: 2022-04-08

## 2022-04-05 RX ORDER — SENNA PLUS 8.6 MG/1
1 TABLET ORAL
Qty: 0 | Refills: 0 | DISCHARGE

## 2022-04-05 RX ADMIN — HEPARIN SODIUM 5000 UNIT(S): 5000 INJECTION INTRAVENOUS; SUBCUTANEOUS at 05:15

## 2022-04-05 RX ADMIN — Medication 2 MILLIGRAM(S): at 22:00

## 2022-04-05 RX ADMIN — SODIUM ZIRCONIUM CYCLOSILICATE 5 GRAM(S): 10 POWDER, FOR SUSPENSION ORAL at 18:34

## 2022-04-05 RX ADMIN — TAMSULOSIN HYDROCHLORIDE 0.4 MILLIGRAM(S): 0.4 CAPSULE ORAL at 19:01

## 2022-04-05 RX ADMIN — MONTELUKAST 10 MILLIGRAM(S): 4 TABLET, CHEWABLE ORAL at 15:27

## 2022-04-05 RX ADMIN — HEPARIN SODIUM 5000 UNIT(S): 5000 INJECTION INTRAVENOUS; SUBCUTANEOUS at 21:59

## 2022-04-05 RX ADMIN — FAMOTIDINE 20 MILLIGRAM(S): 10 INJECTION INTRAVENOUS at 18:39

## 2022-04-05 RX ADMIN — SENNA PLUS 2 TABLET(S): 8.6 TABLET ORAL at 21:59

## 2022-04-05 RX ADMIN — GABAPENTIN 600 MILLIGRAM(S): 400 CAPSULE ORAL at 15:26

## 2022-04-05 RX ADMIN — BUDESONIDE AND FORMOTEROL FUMARATE DIHYDRATE 2 PUFF(S): 160; 4.5 AEROSOL RESPIRATORY (INHALATION) at 22:01

## 2022-04-05 RX ADMIN — FINASTERIDE 5 MILLIGRAM(S): 5 TABLET, FILM COATED ORAL at 15:24

## 2022-04-05 RX ADMIN — ATORVASTATIN CALCIUM 20 MILLIGRAM(S): 80 TABLET, FILM COATED ORAL at 22:00

## 2022-04-05 RX ADMIN — Medication 25 MILLIGRAM(S): at 00:19

## 2022-04-05 RX ADMIN — DULOXETINE HYDROCHLORIDE 30 MILLIGRAM(S): 30 CAPSULE, DELAYED RELEASE ORAL at 05:15

## 2022-04-05 RX ADMIN — GABAPENTIN 600 MILLIGRAM(S): 400 CAPSULE ORAL at 22:00

## 2022-04-05 RX ADMIN — DULOXETINE HYDROCHLORIDE 30 MILLIGRAM(S): 30 CAPSULE, DELAYED RELEASE ORAL at 18:33

## 2022-04-05 RX ADMIN — OXCARBAZEPINE 300 MILLIGRAM(S): 300 TABLET, FILM COATED ORAL at 18:33

## 2022-04-05 RX ADMIN — Medication 81 MILLIGRAM(S): at 15:23

## 2022-04-05 RX ADMIN — POLYETHYLENE GLYCOL 3350 17 GRAM(S): 17 POWDER, FOR SOLUTION ORAL at 15:23

## 2022-04-05 RX ADMIN — OXYCODONE HYDROCHLORIDE 15 MILLIGRAM(S): 5 TABLET ORAL at 22:30

## 2022-04-05 RX ADMIN — CEFTRIAXONE 100 MILLIGRAM(S): 500 INJECTION, POWDER, FOR SOLUTION INTRAMUSCULAR; INTRAVENOUS at 18:34

## 2022-04-05 RX ADMIN — Medication 25 MICROGRAM(S): at 05:15

## 2022-04-05 RX ADMIN — Medication 40 MILLIGRAM(S): at 18:33

## 2022-04-05 RX ADMIN — Medication 10 MILLIGRAM(S): at 19:45

## 2022-04-05 RX ADMIN — QUETIAPINE FUMARATE 100 MILLIGRAM(S): 200 TABLET, FILM COATED ORAL at 21:59

## 2022-04-05 RX ADMIN — GABAPENTIN 600 MILLIGRAM(S): 400 CAPSULE ORAL at 10:13

## 2022-04-05 RX ADMIN — Medication 1 BOTTLE: at 18:33

## 2022-04-05 RX ADMIN — AZITHROMYCIN 255 MILLIGRAM(S): 500 TABLET, FILM COATED ORAL at 19:01

## 2022-04-05 RX ADMIN — Medication 10 MILLIGRAM(S): at 05:16

## 2022-04-05 RX ADMIN — OXYCODONE HYDROCHLORIDE 15 MILLIGRAM(S): 5 TABLET ORAL at 05:14

## 2022-04-05 RX ADMIN — Medication 40 MILLIGRAM(S): at 00:19

## 2022-04-05 RX ADMIN — BUDESONIDE AND FORMOTEROL FUMARATE DIHYDRATE 2 PUFF(S): 160; 4.5 AEROSOL RESPIRATORY (INHALATION) at 18:38

## 2022-04-05 RX ADMIN — SODIUM CHLORIDE 100 MILLILITER(S): 9 INJECTION, SOLUTION INTRAVENOUS at 02:40

## 2022-04-05 RX ADMIN — OXYCODONE HYDROCHLORIDE 15 MILLIGRAM(S): 5 TABLET ORAL at 15:23

## 2022-04-05 RX ADMIN — HEPARIN SODIUM 5000 UNIT(S): 5000 INJECTION INTRAVENOUS; SUBCUTANEOUS at 15:24

## 2022-04-05 RX ADMIN — OXYCODONE HYDROCHLORIDE 15 MILLIGRAM(S): 5 TABLET ORAL at 22:00

## 2022-04-05 RX ADMIN — Medication 2 MILLIGRAM(S): at 00:19

## 2022-04-05 RX ADMIN — OXCARBAZEPINE 300 MILLIGRAM(S): 300 TABLET, FILM COATED ORAL at 05:14

## 2022-04-05 RX ADMIN — FAMOTIDINE 20 MILLIGRAM(S): 10 INJECTION INTRAVENOUS at 05:15

## 2022-04-05 NOTE — PATIENT PROFILE ADULT - FALL HARM RISK - HARM RISK INTERVENTIONS

## 2022-04-05 NOTE — PROGRESS NOTE ADULT - SUBJECTIVE AND OBJECTIVE BOX
Patient is a 66y old  Male who presents with a chief complaint of Unresponsiveness (05 Apr 2022 10:07)    PATIENT IS SEEN AND EXAMINED IN MEDICAL FLOOR.  DIONNET [    ]    HEATH [   ]      GT [   ]    ALLERGIES:  No Known Allergies      Daily     Daily     VITALS:    Vital Signs Last 24 Hrs  T(C): 36.3 (05 Apr 2022 20:47), Max: 37.1 (05 Apr 2022 00:15)  T(F): 97.4 (05 Apr 2022 20:47), Max: 98.7 (05 Apr 2022 00:15)  HR: 58 (05 Apr 2022 20:47) (45 - 90)  BP: 105/59 (05 Apr 2022 20:47) (105/59 - 144/76)  BP(mean): --  RR: 17 (05 Apr 2022 20:47) (16 - 18)  SpO2: 98% (05 Apr 2022 20:47) (95% - 100%)    LABS:    CBC Full  -  ( 05 Apr 2022 05:53 )  WBC Count : 6.30 K/uL  RBC Count : 3.34 M/uL  Hemoglobin : 11.4 g/dL  Hematocrit : 34.6 %  Platelet Count - Automated : 242 K/uL  Mean Cell Volume : 103.6 fl  Mean Cell Hemoglobin : 34.1 pg  Mean Cell Hemoglobin Concentration : 32.9 gm/dL  Auto Neutrophil # : 3.12 K/uL  Auto Lymphocyte # : 1.35 K/uL  Auto Monocyte # : 1.04 K/uL  Auto Eosinophil # : 0.72 K/uL  Auto Basophil # : 0.04 K/uL  Auto Neutrophil % : 49.6 %  Auto Lymphocyte % : 21.4 %  Auto Monocyte % : 16.5 %  Auto Eosinophil % : 11.4 %  Auto Basophil % : 0.6 %    PT/INR - ( 04 Apr 2022 22:12 )   PT: 12.2 sec;   INR: 1.02 ratio         PTT - ( 04 Apr 2022 22:12 )  PTT:32.9 sec  04-05    x   |  x   |  x   ----------------------------<  x   4.0   |  x   |  x     Ca    8.6      05 Apr 2022 05:53  Phos  3.6     04-05  Mg     2.0     04-05    TPro  6.1  /  Alb  3.1<L>  /  TBili  0.4  /  DBili  x   /  AST  21  /  ALT  18  /  AlkPhos  63  04-05    CAPILLARY BLOOD GLUCOSE            LIVER FUNCTIONS - ( 05 Apr 2022 05:53 )  Alb: 3.1 g/dL / Pro: 6.1 g/dL / ALK PHOS: 63 U/L / ALT: 18 U/L DA / AST: 21 U/L / GGT: x           Creatinine Trend: 1.24<--, 1.38<--  I&O's Summary    04 Apr 2022 07:01  -  05 Apr 2022 07:00  --------------------------------------------------------  IN: 0 mL / OUT: 1100 mL / NET: -1100 mL    05 Apr 2022 07:01  -  05 Apr 2022 23:15  --------------------------------------------------------  IN: 0 mL / OUT: 700 mL / NET: -700 mL                MEDICATIONS:    MEDICATIONS  (STANDING):  aspirin  chewable 81 milliGRAM(s) Oral daily  atorvastatin 20 milliGRAM(s) Oral at bedtime  azithromycin  IVPB      baclofen 10 milliGRAM(s) Oral two times a day  budesonide 160 MICROgram(s)/formoterol 4.5 MICROgram(s) Inhaler 2 Puff(s) Inhalation two times a day  cefTRIAXone   IVPB 1000 milliGRAM(s) IV Intermittent every 24 hours  DULoxetine 30 milliGRAM(s) Oral two times a day  famotidine    Tablet 20 milliGRAM(s) Oral two times a day  finasteride 5 milliGRAM(s) Oral daily  gabapentin 600 milliGRAM(s) Oral three times a day  heparin   Injectable 5000 Unit(s) SubCutaneous every 8 hours  lactated ringers. 1000 milliLiter(s) (100 mL/Hr) IV Continuous <Continuous>  levothyroxine 25 MICROGram(s) Oral daily  LORazepam     Tablet 2 milliGRAM(s) Oral at bedtime  methylPREDNISolone sodium succinate Injectable 40 milliGRAM(s) IV Push every 12 hours  montelukast 10 milliGRAM(s) Oral daily  OXcarbazepine 300 milliGRAM(s) Oral two times a day  oxyCODONE    IR 15 milliGRAM(s) Oral three times a day  polyethylene glycol 3350 17 Gram(s) Oral daily  QUEtiapine 100 milliGRAM(s) Oral at bedtime  senna 2 Tablet(s) Oral at bedtime  tamsulosin 0.4 milliGRAM(s) Oral two times a day      MEDICATIONS  (PRN):  ALBUTerol    90 MICROgram(s) HFA Inhaler 2 Puff(s) Inhalation every 6 hours PRN Shortness of Breath and/or Wheezing      REVIEW OF SYSTEMS:                           ALL ROS DONE [ X   ]    CONSTITUTIONAL:  LETHARGIC [   ], FEVER [   ], UNRESPONSIVE [   ]  CVS:  CP  [   ], SOB, [   ], PALPITATIONS [   ], DIZZYNESS [   ]  RS: COUGH [   ], SPUTUM [   ]  GI: ABDOMINAL PAIN [   ], NAUSEA [   ], VOMITINGS [   ], DIARRHEA [   ], CONSTIPATION [   ]  :  DYSURIA [   ], NOCTURIA [   ], INCREASED FREQUENCY [   ], DRIBLING [   ],  SKELETAL: PAINFUL JOINTS [   ], SWOLLEN JOINTS [   ], NECK ACHE [   ], LOW BACK ACHE [   ],  SKIN : ULCERS [   ], RASH [   ], ITCHING [   ]  CNS: HEAD ACHE [   ], DOUBLE VISION [   ], BLURRED VISION [   ], AMS / CONFUSION [   ], SEIZURES [   ], WEAKNESS [   ],TINGLING / NUMBNESS [   ]    PHYSICAL EXAMINATION:  GENERAL APPEARANCE: NO DISTRESS  HEENT:  NO PALLOR, NO  JVD,  NO   NODES, NECK SUPPLE  CVS: S1 +, S2 +,   RS: AEEB,  OCCASIONAL  RALES +, wheezing+  ABD: SOFT, NT, NO, BS +  EXT: NO PE  SKIN: WARM,   SKELETAL:  ROM ACCEPTABLE  CNS:  AAO X  2-3    RADIOLOGY :    ACC: [id number] EXAM:  CT CHEST                        ACC: [id number] EXAM:  CT ABDOMEN AND PELVIS                          PROCEDURE DATE:  04/05/2022          INTERPRETATION:  CLINICAL INFORMATION: Elevated lipase. Altered mental   status. Evaluate for pancreatitis.    COMPARISON: CT abdomen pelvis 10/18/2013    CONTRAST/COMPLICATIONS:  IV Contrast: None  Oral Contrast: None  Complications: None reported    PROCEDURE:  CT of the Chest, Abdomen and Pelvis was performed.  Sagittal and coronal reformats were performed.    FINDINGS:    CHEST:  LUNGS AND LARGE AIRWAYS: Narrowing of anterior posterior dimension of the   trachea is noted, concerning for tracheomalacia. Central airways are   patent. Emphysema. Multifocal dependent lung parenchymal opacities most   prominent of right lower lobe concerning for multifocal pneumonia. There   is a 6 mm peripheral/subpleural nodule of the right middle lobe, image 96   series 3. Left lower lobe calcified granuloma.  PLEURA: No pleural effusion or pneumothorax.  VESSELS: Ectasia of the thoracic aorta, without overt aneurysm, and with   mild atheromatous change. Main pulmonary artery size is mildly enlarged,   3.1 cm.  HEART: Heart size qualitatively within normal limits. Aortic root/valve   calcification and coronary artery calcification. Trace pericardial fluid.  MEDIASTINUM AND LINDA: Small volume nodes. Patulous upper esophagus.   Moderate hiatal hernia.  CHEST WALL AND LOWER NECK: No chest wall hematoma. Visualized thyroid is   unremarkable.    ABDOMEN AND PELVIS:    Solid organ evaluation limited due to noncontrast technique.    LIVER: Liver size within normal limits. Peripheral low-attenuation at the   dome with few coarse calcifications, better seen on prior CT from 2013,   with interval evolution.  BILE DUCTS: No biliary distention  GALLBLADDER: Unremarkable CT appearance  SPLEEN: Splenules of the left upper quadrant.  PANCREAS: No significant peripancreatic inflammation or fluid. No main   ductal dilatation  ADRENALS: Unremarkable  KIDNEYS/URETERS: No hydronephrosis.    BLADDER: Mildly distended.  REPRODUCTIVE ORGANS: TURP defect of prostate.    BOWEL: Moderate hiatal hernia. Stomach is minimally distended. No small   bowel distention. Nondistended and noninflamed appendix. Mild stool   burden of the colon limits evaluation of the colonic mucosa.  PERITONEUM: No ascites. Slight nonspecific mesenteric haziness and a few   small volume mesenteric nodes.  VESSELS: Atheromatous changes. No abdominal aortic aneurysm.  RETROPERITONEUM/LYMPH NODES: Small volume nodes of the mesentery up to 6   mm.  ABDOMINAL WALL: Unremarkable  BONES: Left hip prosthesis. Sclerosis of the right femoral head.   Avascular necrosis versus other degenerative change can be considered.   Correlatewith pelvic MRI for further characterization. Old left rib   fractures. Right humeral head suture anchor.    IMPRESSION:    Chest:  1. Multifocal pneumonia. Follow to resolution with repeat chest CT in 6   weeks. A 6 mm right upper lobe peripheral/subpleural nodule can be   further evaluated on subsequent chest CT.  2. Emphysema and suggestion of tracheomalacia.  3. Coronary artery and aortic root/valve calcification.    Abdomen/Pelvis:  1. No CT evidence of acute pancreatitis.  2. Sclerosis of the right femoral head. Avascular necrosis versus other   degenerative change can be considered. Correlate with pelvic MRI for   further characterization.      ASSESSMENT :     Altered mental status    Calculus of Kidney    Tinea Versicolor    Depression    Anxiety    UTI (Urinary Tract Infection)    Current smoker    Rotator Cuff Tear Repair    percutaneous stone extraction - Left    Left Ureter Stent Placement        PLAN:  HPI:  Pt is a 67 yo M from [facility] with, PMH of HTN, DM, COPD, bipolar disorder, BPH sent to the ED for unresponsiveness. Based on history obtained from EMS patient was watching TV and he suddenly lost consciousness. GCS 3. When he came to the ED, he was back to baseline with GCS 15. No c/o chest pain, shortness of breath, fever, headache, dizziness, N/V, abdominal pain, urinary complaints. (05 Apr 2022 01:23)    # ACUTE ENCEPHALOPATHY VS. SYNCOPE - CURRENTLY AT BASELINE   - MONITOR ON TELEMETRY   - REVIEWED CT HEAD  - F/U ECHO  - F/U EEG  - ? ORTHOSTATIC BP, F/U ORTHOSTATIC VITALS, MED. REC INCLUDES MIDODRINE  - ? 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

## 2022-04-05 NOTE — H&P ADULT - ASSESSMENT
66M, pmh of HTN, DM, COPD, bipolar disorder sent to the ED for unresponsiveness. Admitted for unresponsives workup and urinary retention.

## 2022-04-05 NOTE — H&P ADULT - PROBLEM SELECTOR PLAN 5
Pt has history of anxiety and depression   started on home meds of quetiapine, oxcarbazepine, and duloxetine

## 2022-04-05 NOTE — H&P ADULT - PROBLEM SELECTOR PLAN 4
Pt has history of COPD  Diffuse wheezes heard on exam   Hypoxic on RA at 90s  started on Symbicort inhaler BID  started home meds of albuterol, Singulair and spiriva Carac Counseling:  I discussed with the patient the risks of Carac including but not limited to erythema, scaling, itching, weeping, crusting, and pain.

## 2022-04-05 NOTE — PATIENT PROFILE ADULT - STATED REASON FOR ADMISSION
[facility]. wAS SEATED IN A CHAIR PASSED [facility] AT [facility]. When asked what happened, patient reponded, "I don't know".

## 2022-04-05 NOTE — H&P ADULT - PROBLEM SELECTOR PLAN 2
On admission pts bladder scan showed more than 1000cc  Pt has history of BPH  Coude sinclair cathter placed in ED  Drained around 1200cc and it was clamped for sometime  monitor urine output On admission pts bladder scan showed more than 1000cc  Pt has history of BPH  Coude sinclair cathter placed in ED  Drained around 1200cc and it was clamped for sometime  monitor urine output  started on proscar and flomax  Urology to be consulted by Primary team in AM

## 2022-04-05 NOTE — ED ADULT NURSE REASSESSMENT NOTE - NS ED NURSE REASSESS COMMENT FT1
Received patient from night RN admitted to telemetry with SB in no acute distress , drowsy but responsive to all verbal stimuli. A&OX3 awaiting bed continue to monitor patient.

## 2022-04-05 NOTE — CHART NOTE - NSCHARTNOTEFT_GEN_A_CORE
HPI:  Pt is a 67 yo M from [facility] with, PMH of HTN, DM, COPD, bipolar disorder, BPH sent to the ED for unresponsiveness. Based on history obtained from EMS patient was watching TV and he suddenly lost consciousness. GCS 3. When he came to the ED, he was back to baseline with GCS 15. No c/o chest pain, shortness of breath, fever, headache, dizziness, N/V, abdominal pain, urinary complaints. (05 Apr 2022 01:23)        OBJECTIVE:  Vital Signs Last 24 Hrs  T(C): 36.4 (05 Apr 2022 15:46), Max: 37.1 (05 Apr 2022 00:15)  T(F): 97.5 (05 Apr 2022 15:46), Max: 98.7 (05 Apr 2022 00:15)  HR: 56 (05 Apr 2022 15:46) (45 - 90)  BP: 132/77 (05 Apr 2022 15:46) (89/51 - 144/76)  BP(mean): --  RR: 17 (05 Apr 2022 15:46) (16 - 24)  SpO2: 95% (05 Apr 2022 15:46) (95% - 100%)    LABS:                        11.4   6.30  )-----------( 242      ( 05 Apr 2022 05:53 )             34.6     04-05    141  |  110<H>  |  15  ----------------------------<  116<H>  5.4<H>   |  30  |  1.24    Ca    8.6      05 Apr 2022 05:53  Phos  3.6     04-05  Mg     2.0     04-05    TPro  6.1  /  Alb  3.1<L>  /  TBili  0.4  /  DBili  x   /  AST  21  /  ALT  18  /  AlkPhos  63  04-05      ASSESSMENT:  1 - CT indicates PNA  2 - K 5.4  3 - Urinary retention  4 - Unresponsive prior to admit    PLAN:   1 - Start Azithromycin  2 - Give Lokelma  3 - Maintain sinclair, increase Flomax to BID  4 - Monitor on tele for 24H, echo     FOLLOW UP/RESULTS:    1 - Oxygenation, WBCs  2 - Repeat K  3 - TOV in 5 days  4 - TTE results  AM labs

## 2022-04-05 NOTE — PATIENT PROFILE ADULT - FALL HARM RISK - FACTORS
No acute overnight eivents    Vital Signs Last 24 Hrs  T(C): 36.8 (03 Jun 2018 06:19), Max: 36.8 (02 Jun 2018 21:35)  T(F): 98.2 (03 Jun 2018 06:19), Max: 98.2 (02 Jun 2018 21:35)  HR: 66 (03 Jun 2018 06:19) (62 - 71)  BP: 119/71 (03 Jun 2018 06:19) (114/70 - 128/79)  BP(mean): --  RR: 17 (03 Jun 2018 06:19) (17 - 17)  SpO2: 100% (03 Jun 2018 06:19) (100% - 100%)    General: more comfortable  HEENT: EOMI, NCAT  Neck: no JVD  CV: S1S2 RRR no MRG  Lungs: CTA BL  Abdomen: soft NTND +BS   Extremities: No CCE +WWP, (+) Right ankle wrapped in ace bandages, pin sites c/d/i  Skin: No rashes  Neuro: AAOx3, no focal deficits     LABS:            CAPILLARY BLOOD GLUCOSE Syncope Impaired gait/Syncope

## 2022-04-05 NOTE — H&P ADULT - PROBLEM SELECTOR PLAN 1
Pt was sent from NH for unresponsiveness, GCS score of 3  In ED, GCS improved to 15  Vitals 89/51 mmHg, HR 60s, afebrile, 90% on RA  EKG showed sinus bradycardia of 53  Blood glucose 128  Electrolytes wnl  CT head showed No acute intracranial CT abnormality.  most probably syncope secondary to low BPs vs bradycardia vs urinary retention  Admitted to telemetry  follow up CT chest, abdomen and pelvis  follow up urinalysis and urine culture  ordered Echocardiogram Pt was sent from NH for unresponsiveness, GCS score of 3  In ED, GCS improved to 15  Vitals 89/51 mmHg, HR 60s, afebrile, 90% on RA  EKG showed sinus bradycardia of 53  Blood glucose 128  Electrolytes wnl  CT head showed No acute intracranial CT abnormality.  most probably syncope secondary to low BPs vs bradycardia vs urinary retention  Admitted to telemetry  follow up CT chest, abdomen and pelvis  started on Rocephin for empirical coverage  follow up urinalysis and urine culture  ordered Echocardiogram  Cardiology Dr. Rapp consulted

## 2022-04-05 NOTE — H&P ADULT - HISTORY OF PRESENT ILLNESS
66M, pmh of HTN, DM, COPD, bipolar disorder sent to the ED for unresponsiveness. history obtained from EMS at bedside. patient was speaking with another member of the assisted living facility when he suddenly lost consciousness. was unresponsive when EMS arrived and was hypotensive. responded slightly to pain. GCS 3 on notification call. 66M from [facility] with, PMH of HTN, DM, COPD, bipolar disorder, BPH sent to the ED for unresponsiveness. history obtained from EMS at bedside. patient was speaking with another member of the assisted living facility when he suddenly lost consciousness. was unresponsive when EMS arrived and was hypotensive. responded slightly to pain. GCS 3 on notification call. Pt is a 67 yo M from [facility] with, PMH of HTN, DM, COPD, bipolar disorder, BPH sent to the ED for unresponsiveness. Based on history obtained from EMS patient was watching TV and he suddenly lost consciousness. GCS 3. When he came to the ED, he was back to baseline with GCS 15. No c/o chest pain, shortness of breath, fever, headache, dizziness, N/V, abdominal pain, urinary complaints.

## 2022-04-05 NOTE — H&P ADULT - NSHPPHYSICALEXAM_GEN_ALL_CORE
Vital Signs Last 24 Hrs  T(C): 37.1 (05 Apr 2022 00:15), Max: 37.1 (05 Apr 2022 00:15)  T(F): 98.7 (05 Apr 2022 00:15), Max: 98.7 (05 Apr 2022 00:15)  HR: 90 (04 Apr 2022 23:47) (57 - 90)  BP: 132/82 (04 Apr 2022 23:47) (89/51 - 132/82)  RR: 18 (04 Apr 2022 23:47) (18 - 24)  SpO2: 100% (04 Apr 2022 23:47) (100% - 100%)    GENERAL: NAD, lying in bed comfortably  HEAD:  Atraumatic, Normocephalic  EYES: EOMI, PERRLA, conjunctiva and sclera clear  ENT: Moist mucous membranes  NECK: Supple, No JVD  CHEST/LUNG: Clear to auscultation bilaterally; No rales, rhonchi, wheezing, or rubs. Unlabored respirations  HEART: Regular rate and rhythm; No murmurs, rubs, or gallops  ABDOMEN: Bowel sounds present; Soft, Nontender, Nondistended. No hepatomegaly  EXTREMITIES:  2+ Peripheral Pulses, brisk capillary refill. No clubbing, cyanosis, or edema  NERVOUS SYSTEM:  Alert & Oriented X3, speech clear. No deficits   MSK: FROM all 4 extremities, full and equal strength  SKIN: No rashes or lesions

## 2022-04-06 DIAGNOSIS — J18.9 PNEUMONIA, UNSPECIFIED ORGANISM: ICD-10-CM

## 2022-04-06 LAB
ANION GAP SERPL CALC-SCNC: 7 MMOL/L — SIGNIFICANT CHANGE UP (ref 5–17)
BUN SERPL-MCNC: 13 MG/DL — SIGNIFICANT CHANGE UP (ref 7–18)
CALCIUM SERPL-MCNC: 8.7 MG/DL — SIGNIFICANT CHANGE UP (ref 8.4–10.5)
CHLORIDE SERPL-SCNC: 108 MMOL/L — SIGNIFICANT CHANGE UP (ref 96–108)
CO2 SERPL-SCNC: 25 MMOL/L — SIGNIFICANT CHANGE UP (ref 22–31)
CREAT SERPL-MCNC: 0.9 MG/DL — SIGNIFICANT CHANGE UP (ref 0.5–1.3)
CULTURE RESULTS: SIGNIFICANT CHANGE UP
EGFR: 95 ML/MIN/1.73M2 — SIGNIFICANT CHANGE UP
GLUCOSE SERPL-MCNC: 109 MG/DL — HIGH (ref 70–99)
HCT VFR BLD CALC: 35.3 % — LOW (ref 39–50)
HGB BLD-MCNC: 12 G/DL — LOW (ref 13–17)
MAGNESIUM SERPL-MCNC: 2.2 MG/DL — SIGNIFICANT CHANGE UP (ref 1.6–2.6)
MCHC RBC-ENTMCNC: 34 GM/DL — SIGNIFICANT CHANGE UP (ref 32–36)
MCHC RBC-ENTMCNC: 34.6 PG — HIGH (ref 27–34)
MCV RBC AUTO: 101.7 FL — HIGH (ref 80–100)
NRBC # BLD: 0 /100 WBCS — SIGNIFICANT CHANGE UP (ref 0–0)
PHOSPHATE SERPL-MCNC: 3.2 MG/DL — SIGNIFICANT CHANGE UP (ref 2.5–4.5)
PLATELET # BLD AUTO: 271 K/UL — SIGNIFICANT CHANGE UP (ref 150–400)
POTASSIUM SERPL-MCNC: 4.3 MMOL/L — SIGNIFICANT CHANGE UP (ref 3.5–5.3)
POTASSIUM SERPL-SCNC: 4.3 MMOL/L — SIGNIFICANT CHANGE UP (ref 3.5–5.3)
RBC # BLD: 3.47 M/UL — LOW (ref 4.2–5.8)
RBC # FLD: 13.7 % — SIGNIFICANT CHANGE UP (ref 10.3–14.5)
SODIUM SERPL-SCNC: 140 MMOL/L — SIGNIFICANT CHANGE UP (ref 135–145)
SPECIMEN SOURCE: SIGNIFICANT CHANGE UP
WBC # BLD: 7.72 K/UL — SIGNIFICANT CHANGE UP (ref 3.8–10.5)
WBC # FLD AUTO: 7.72 K/UL — SIGNIFICANT CHANGE UP (ref 3.8–10.5)

## 2022-04-06 PROCEDURE — 99223 1ST HOSP IP/OBS HIGH 75: CPT

## 2022-04-06 PROCEDURE — 95819 EEG AWAKE AND ASLEEP: CPT | Mod: 26

## 2022-04-06 RX ADMIN — Medication 40 MILLIGRAM(S): at 06:58

## 2022-04-06 RX ADMIN — DULOXETINE HYDROCHLORIDE 30 MILLIGRAM(S): 30 CAPSULE, DELAYED RELEASE ORAL at 17:20

## 2022-04-06 RX ADMIN — OXCARBAZEPINE 300 MILLIGRAM(S): 300 TABLET, FILM COATED ORAL at 06:56

## 2022-04-06 RX ADMIN — Medication 81 MILLIGRAM(S): at 11:46

## 2022-04-06 RX ADMIN — OXYCODONE HYDROCHLORIDE 15 MILLIGRAM(S): 5 TABLET ORAL at 13:41

## 2022-04-06 RX ADMIN — HEPARIN SODIUM 5000 UNIT(S): 5000 INJECTION INTRAVENOUS; SUBCUTANEOUS at 21:22

## 2022-04-06 RX ADMIN — QUETIAPINE FUMARATE 100 MILLIGRAM(S): 200 TABLET, FILM COATED ORAL at 21:22

## 2022-04-06 RX ADMIN — TAMSULOSIN HYDROCHLORIDE 0.4 MILLIGRAM(S): 0.4 CAPSULE ORAL at 18:02

## 2022-04-06 RX ADMIN — OXYCODONE HYDROCHLORIDE 15 MILLIGRAM(S): 5 TABLET ORAL at 07:03

## 2022-04-06 RX ADMIN — CEFTRIAXONE 100 MILLIGRAM(S): 500 INJECTION, POWDER, FOR SOLUTION INTRAMUSCULAR; INTRAVENOUS at 17:56

## 2022-04-06 RX ADMIN — Medication 10 MILLIGRAM(S): at 06:56

## 2022-04-06 RX ADMIN — MONTELUKAST 10 MILLIGRAM(S): 4 TABLET, CHEWABLE ORAL at 11:47

## 2022-04-06 RX ADMIN — Medication 25 MICROGRAM(S): at 06:57

## 2022-04-06 RX ADMIN — POLYETHYLENE GLYCOL 3350 17 GRAM(S): 17 POWDER, FOR SOLUTION ORAL at 11:47

## 2022-04-06 RX ADMIN — OXYCODONE HYDROCHLORIDE 15 MILLIGRAM(S): 5 TABLET ORAL at 21:22

## 2022-04-06 RX ADMIN — FAMOTIDINE 20 MILLIGRAM(S): 10 INJECTION INTRAVENOUS at 17:30

## 2022-04-06 RX ADMIN — GABAPENTIN 600 MILLIGRAM(S): 400 CAPSULE ORAL at 21:22

## 2022-04-06 RX ADMIN — OXYCODONE HYDROCHLORIDE 15 MILLIGRAM(S): 5 TABLET ORAL at 22:00

## 2022-04-06 RX ADMIN — FAMOTIDINE 20 MILLIGRAM(S): 10 INJECTION INTRAVENOUS at 06:56

## 2022-04-06 RX ADMIN — BUDESONIDE AND FORMOTEROL FUMARATE DIHYDRATE 2 PUFF(S): 160; 4.5 AEROSOL RESPIRATORY (INHALATION) at 21:23

## 2022-04-06 RX ADMIN — FINASTERIDE 5 MILLIGRAM(S): 5 TABLET, FILM COATED ORAL at 11:47

## 2022-04-06 RX ADMIN — OXYCODONE HYDROCHLORIDE 15 MILLIGRAM(S): 5 TABLET ORAL at 15:44

## 2022-04-06 RX ADMIN — Medication 10 MILLIGRAM(S): at 17:19

## 2022-04-06 RX ADMIN — AZITHROMYCIN 255 MILLIGRAM(S): 500 TABLET, FILM COATED ORAL at 17:18

## 2022-04-06 RX ADMIN — BUDESONIDE AND FORMOTEROL FUMARATE DIHYDRATE 2 PUFF(S): 160; 4.5 AEROSOL RESPIRATORY (INHALATION) at 11:48

## 2022-04-06 RX ADMIN — ATORVASTATIN CALCIUM 20 MILLIGRAM(S): 80 TABLET, FILM COATED ORAL at 21:22

## 2022-04-06 RX ADMIN — TAMSULOSIN HYDROCHLORIDE 0.4 MILLIGRAM(S): 0.4 CAPSULE ORAL at 06:56

## 2022-04-06 RX ADMIN — GABAPENTIN 600 MILLIGRAM(S): 400 CAPSULE ORAL at 07:01

## 2022-04-06 RX ADMIN — Medication 2 MILLIGRAM(S): at 21:22

## 2022-04-06 RX ADMIN — HEPARIN SODIUM 5000 UNIT(S): 5000 INJECTION INTRAVENOUS; SUBCUTANEOUS at 06:57

## 2022-04-06 RX ADMIN — HEPARIN SODIUM 5000 UNIT(S): 5000 INJECTION INTRAVENOUS; SUBCUTANEOUS at 13:41

## 2022-04-06 RX ADMIN — SENNA PLUS 2 TABLET(S): 8.6 TABLET ORAL at 21:22

## 2022-04-06 RX ADMIN — OXCARBAZEPINE 300 MILLIGRAM(S): 300 TABLET, FILM COATED ORAL at 17:21

## 2022-04-06 RX ADMIN — OXYCODONE HYDROCHLORIDE 15 MILLIGRAM(S): 5 TABLET ORAL at 07:33

## 2022-04-06 RX ADMIN — GABAPENTIN 600 MILLIGRAM(S): 400 CAPSULE ORAL at 13:41

## 2022-04-06 RX ADMIN — Medication 40 MILLIGRAM(S): at 17:20

## 2022-04-06 RX ADMIN — DULOXETINE HYDROCHLORIDE 30 MILLIGRAM(S): 30 CAPSULE, DELAYED RELEASE ORAL at 06:57

## 2022-04-06 NOTE — EEG REPORT - NS EEG TEXT BOX
[facility]  Comprehensive Epilepsy Center  Report of Routine EEG with Video    [facility]: [address], [place] [address], Phone [phone]  [facility]: [address] [place] [address], Phone [phone]  [place] Office: [address], [address], [place] [address] Phone [phone]    [facility]: [address] [place] [address], Phone [phone]  [place] Office: [address] [place] [address], Phone [phone]    Patient Name: MARNIE FRY    Age: 66 year  : 1955  Patient ID: -, MRN #: [id number], Location: [address]  Referring Physician: Dr. Alvarado  EEG #: [id number]    Study Date: 2022     Technical Information:					  On Instrument: -  Placement and Labeling of Electrodes:  The EEG was performed utilizing 20 channels referential EEG connections (coronal over temporal over parasagittal montage) using all standard 10-20 electrode placements with EKG.  Recording was at a sampling rate of 256 samples per second per channel.  Time synchronized digital video recording was done simultaneously with EEG recording.  A low light infrared camera was used for low light recording.  Angel and seizure detection algorithms were utilized.    History:   Routine study..Performed bedside  Patient awake and alert  HV not performed since covid and photic performed  65 Y/O male presents with :  R/O seizures  Found unresponsive  Altered mental status  Current smoker  Anxiety  Depression  COPD with hypoxia    Pertinent Medication  Neurotin (Gabapentin)    Study Interpretation:  Findings: The background was continuous and reactive. During wakefulness, the posterior dominant rhythm consisted of symmetric, well-modulated 6 Hz activity, which is lower than normal for age, with amplitude to 30 uV, that attenuated to eye opening.     Background Slowing:  No generalized background slowing was present.    Focal Slowing:   None were present.    Sleep Background:  Drowsiness was characterized by fragmentation, attenuation, and slowing of the background activity.    Stage II sleep transients were not recorded.    Other Non-Epileptiform Findings:  None were present.    Interictal Epileptiform Activity:   None were present.    Events:  No event or seizure recorded.    Activation Procedures:   Hyperventilation was not performed.    Photic stimulation was performed and did not elicit any abnormalities.      Artifacts:  Intermittent myogenic and movement artifacts were noted.    ECG:  The heart rate on single channel ECG was predominantly between 45 and 55 BPM.    EEG Summary / Classification:  Abnormal EEG in the awake and drowsy states.  - Moderate generalized slowing.    EEG Impression / Clinical Correlate:    Abnormal EEG study.    Generalized background slowing is a nonspecific finding that can be seen in the setting of toxic or metabolic encephalopathy, diffuse or multifocal structural abnormalities of the brain, medication side effect, or infection.    A low heart rate is incidentally noted.    No epileptiform pattern or seizure seen.    ________________________________________    Baldo Hunter MD  Attending Physician, [facility] [place]

## 2022-04-06 NOTE — CONSULT NOTE ADULT - TIME BILLING
I counseled the patient and primary team about the further testing indicated to help determine the etiology of the patient's episode of unresponsiveness.

## 2022-04-06 NOTE — PROGRESS NOTE ADULT - PROBLEM SELECTOR PLAN 5
Pt has history of COPD  Diffuse wheezes heard on exam   Hypoxic on RA at 90s  started on Symbicort inhaler BID  started home meds of albuterol, Singulair and spiriva

## 2022-04-06 NOTE — PROGRESS NOTE ADULT - PROBLEM SELECTOR PLAN 2
CT chest showed multifocal pneumonia, patient with cough  On 2L NC sating 98%  c/w azithromycinm rocephin  c/w IVF  Downtitrated O2 as tolerated

## 2022-04-06 NOTE — PHYSICAL THERAPY INITIAL EVALUATION ADULT - IMPAIRMENTS FOUND, PT EVAL
aerobic capacity/endurance/gait, locomotion, and balance/gross motor/joint integrity and mobility/muscle strength/ventilation and respiration/gas exchange

## 2022-04-06 NOTE — PROGRESS NOTE ADULT - PROBLEM SELECTOR PLAN 1
Pt was sent from NH for unresponsiveness, GCS score of 3  In ED, GCS improved to 15  Vitals 89/51 mmHg, HR 60s, afebrile, 90% on RA  EKG showed sinus bradycardia of 53  Blood glucose 128  Electrolytes wnl  CT head showed No acute intracranial CT abnormality.  most probably syncope secondary to low BPs vs bradycardia  Patient likely has baseline orthostatics given that he's on midodrine though orthostatics negative but did not get standing up, f/u repeat orthostatic  Patient on oxcarbazepine but denies seizure hx  Admitted to telemetry, shows bradycardia to the 40s  f/u Echocardiogram, f/u stress test  Cardiology Dr. Rapp consulted

## 2022-04-06 NOTE — PROGRESS NOTE ADULT - SUBJECTIVE AND OBJECTIVE BOX
C A R D I O [facility] O G Y  **********************************     DATE OF SERVICE: 04-06-22    Patient denies chest pain or shortness of breath.   Review of systems otherwise (-)  	  MEDICATIONS:  MEDICATIONS  (STANDING):  aspirin  chewable 81 milliGRAM(s) Oral daily  atorvastatin 20 milliGRAM(s) Oral at bedtime  azithromycin  IVPB 500 milliGRAM(s) IV Intermittent every 24 hours  azithromycin  IVPB      baclofen 10 milliGRAM(s) Oral two times a day  budesonide 160 MICROgram(s)/formoterol 4.5 MICROgram(s) Inhaler 2 Puff(s) Inhalation two times a day  cefTRIAXone   IVPB 1000 milliGRAM(s) IV Intermittent every 24 hours  DULoxetine 30 milliGRAM(s) Oral two times a day  famotidine    Tablet 20 milliGRAM(s) Oral two times a day  finasteride 5 milliGRAM(s) Oral daily  gabapentin 600 milliGRAM(s) Oral three times a day  heparin   Injectable 5000 Unit(s) SubCutaneous every 8 hours  lactated ringers. 1000 milliLiter(s) (100 mL/Hr) IV Continuous <Continuous>  levothyroxine 25 MICROGram(s) Oral daily  LORazepam     Tablet 2 milliGRAM(s) Oral at bedtime  methylPREDNISolone sodium succinate Injectable 40 milliGRAM(s) IV Push every 12 hours  montelukast 10 milliGRAM(s) Oral daily  OXcarbazepine 300 milliGRAM(s) Oral two times a day  oxyCODONE    IR 15 milliGRAM(s) Oral three times a day  polyethylene glycol 3350 17 Gram(s) Oral daily  QUEtiapine 100 milliGRAM(s) Oral at bedtime  senna 2 Tablet(s) Oral at bedtime  tamsulosin 0.4 milliGRAM(s) Oral two times a day      LABS:	 	    CARDIAC MARKERS:        Troponin I, High Sensitivity Result: 9.7 ng/L (04-04-22 @ 22:12)                              12.0   7.72  )-----------( 271      ( 06 Apr 2022 07:24 )             35.3     Hemoglobin: 12.0 g/dL (04-06 @ 07:24)  Hemoglobin: 11.4 g/dL (04-05 @ 05:53)  Hemoglobin: 11.0 g/dL (04-04 @ 22:12)      04-06    140  |  108  |  13  ----------------------------<  109<H>  4.3   |  25  |  0.90    Ca    8.7      06 Apr 2022 07:24  Phos  3.2     04-06  Mg     2.2     04-06    TPro  6.1  /  Alb  3.1<L>  /  TBili  0.4  /  DBili  x   /  AST  21  /  ALT  18  /  AlkPhos  63  04-05    Creatinine Trend: 0.90<--, 1.24<--, 1.38<--    COAGS:       proBNP:   Lipid Profile:   HgA1c:   TSH:       PHYSICAL EXAM:  T(C): 36.4 (04-06-22 @ 07:44), Max: 36.4 (04-05-22 @ 15:46)  HR: 50 (04-06-22 @ 10:07) (46 - 58)  BP: 109/50 (04-06-22 @ 10:07) (105/59 - 144/76)  RR: 18 (04-06-22 @ 07:44) (17 - 18)  SpO2: 94% (04-06-22 @ 10:07) (92% - 98%)  Wt(kg): --  I&O's Summary    05 Apr 2022 07:01  -  06 Apr 2022 07:00  --------------------------------------------------------  IN: 0 mL / OUT: 1250 mL / NET: -1250 mL          Gen: Appears well in NAD  HEENT:  (-)icterus (-)pallor  CV: N S1 S2 1/6 DAVID (+)2 Pulses B/l  Resp:  mild wheeze B/L, normal effort  GI: (+) BS Soft, NT, ND  Lymph:  (-)Edema, (-)obvious lymphadenopathy  Skin: Warm to touch, Normal turgor  Psych: Appropriate mood and affect      TELEMETRY: 	  sinus Madhavi        ASSESSMENT/PLAN: 	66y  Male [facility] with, PMH of HTN, DM, COPD, bipolar disorder, BPH sent to the ED for unresponsiveness.    - cont tele  - awaiting  Echo  - orthostatic BP.  Suspect he may have orthostasis as he is on midodrine  - Multifocal PNA, abx per Medical team  - Sinus madhavi noted, plan for treadmill nuclear stress test to assess heart rate response to exercise    Elmer Rapp MD, [facility]  BEEPER [phone]

## 2022-04-06 NOTE — PROGRESS NOTE ADULT - SUBJECTIVE AND OBJECTIVE BOX
PGY-1 Progress Note discussed with attending    PAGER #: [[phone]] TILL 5:00 PM  PLEASE CONTACT ON CALL TEAM:  - On Call Team (Please refer to Lai) FROM 5:00 PM - 8:30PM  - Nightfloat Team FROM 8:30 -7:30 AM    HPI:  Pt is a 65 yo M from [facility] with, PMH of HTN, DM, COPD, bipolar disorder, BPH sent to the ED for unresponsiveness. Based on history obtained from EMS patient was watching TV and he suddenly lost consciousness. GCS 3. When he came to the ED, he was back to baseline with GCS 15. No c/o chest pain, shortness of breath, fever, headache, dizziness, N/V, abdominal pain, urinary complaints. (05 Apr 2022 01:23)      OVERNIGHT EVENTS:   - No acute events.     REVIEW OF SYSTEMS:  CONSTITUTIONAL: No fever, weight loss, or fatigue  RESPIRATORY: +cough, + mild SOB, +wheezing, chills or hemoptysis; No shortness of breath  CARDIOVASCULAR: No chest pain, palpitations, dizziness, or leg swelling  GASTROINTESTINAL: No abdominal pain. No nausea, vomiting, or hematemesis; No diarrhea or constipation. No melena or hematochezia.  GENITOURINARY: No dysuria or hematuria, urinary frequency  NEUROLOGICAL: No headaches, memory loss, loss of strength, numbness, or tremors  SKIN: No itching, burning, rashes, or lesions     MEDICATIONS  (STANDING):  aspirin  chewable 81 milliGRAM(s) Oral daily  atorvastatin 20 milliGRAM(s) Oral at bedtime  azithromycin  IVPB 500 milliGRAM(s) IV Intermittent every 24 hours  azithromycin  IVPB      baclofen 10 milliGRAM(s) Oral two times a day  budesonide 160 MICROgram(s)/formoterol 4.5 MICROgram(s) Inhaler 2 Puff(s) Inhalation two times a day  cefTRIAXone   IVPB 1000 milliGRAM(s) IV Intermittent every 24 hours  DULoxetine 30 milliGRAM(s) Oral two times a day  famotidine    Tablet 20 milliGRAM(s) Oral two times a day  finasteride 5 milliGRAM(s) Oral daily  gabapentin 600 milliGRAM(s) Oral three times a day  heparin   Injectable 5000 Unit(s) SubCutaneous every 8 hours  lactated ringers. 1000 milliLiter(s) (100 mL/Hr) IV Continuous <Continuous>  levothyroxine 25 MICROGram(s) Oral daily  LORazepam     Tablet 2 milliGRAM(s) Oral at bedtime  methylPREDNISolone sodium succinate Injectable 40 milliGRAM(s) IV Push every 12 hours  montelukast 10 milliGRAM(s) Oral daily  OXcarbazepine 300 milliGRAM(s) Oral two times a day  oxyCODONE    IR 15 milliGRAM(s) Oral three times a day  polyethylene glycol 3350 17 Gram(s) Oral daily  QUEtiapine 100 milliGRAM(s) Oral at bedtime  senna 2 Tablet(s) Oral at bedtime  tamsulosin 0.4 milliGRAM(s) Oral two times a day    MEDICATIONS  (PRN):  ALBUTerol    90 MICROgram(s) HFA Inhaler 2 Puff(s) Inhalation every 6 hours PRN Shortness of Breath and/or Wheezing      Vital Signs Last 24 Hrs  T(C): 36.3 (06 Apr 2022 11:34), Max: 36.4 (05 Apr 2022 15:46)  T(F): 97.3 (06 Apr 2022 11:34), Max: 97.6 (06 Apr 2022 04:45)  HR: 60 (06 Apr 2022 11:34) (48 - 60)  BP: 106/49 (06 Apr 2022 11:34) (105/59 - 132/77)  BP(mean): --  RR: 18 (06 Apr 2022 11:34) (17 - 18)  SpO2: 98% (06 Apr 2022 11:34) (92% - 98%)    PHYSICAL EXAMINATION:  GENERAL: NAD, AAOx  HEAD: AT/NC  EYES: conjunctiva and sclera clear  NECK: supple, No JVD noted, Normal thyroid  CHEST/LUNG: CTABL; no rales, rhonchi, wheezing, or rubs  HEART: regular rate and rhythm; no murmurs, rubs, or gallops  ABDOMEN: soft, nontender, nondistended; Bowel sounds present  EXTREMITIES:  2+ Peripheral Pulses, No clubbing, cyanosis, or edema  SKIN: warm dry                          12.0   7.72  )-----------( 271      ( 06 Apr 2022 07:24 )             35.3     04-06    140  |  108  |  13  ----------------------------<  109<H>  4.3   |  25  |  0.90    Ca    8.7      06 Apr 2022 07:24  Phos  3.2     04-06  Mg     2.2     04-06    TPro  6.1  /  Alb  3.1<L>  /  TBili  0.4  /  DBili  x   /  AST  21  /  ALT  18  /  AlkPhos  63  04-05    LIVER FUNCTIONS - ( 05 Apr 2022 05:53 )  Alb: 3.1 g/dL / Pro: 6.1 g/dL / ALK PHOS: 63 U/L / ALT: 18 U/L DA / AST: 21 U/L / GGT: x               PT/INR - ( 04 Apr 2022 22:12 )   PT: 12.2 sec;   INR: 1.02 ratio         PTT - ( 04 Apr 2022 22:12 )  PTT:32.9 sec      CAPILLARY BLOOD GLUCOSE          RADIOLOGY & ADDITIONAL TESTS:

## 2022-04-06 NOTE — PROGRESS NOTE ADULT - SUBJECTIVE AND OBJECTIVE BOX
Patient is a 66y old  Male who presents with a chief complaint of Unresponsiveness (06 Apr 2022 10:49)    PATIENT IS SEEN AND EXAMINED IN MEDICAL FLOOR.  NGT [    ]    HEATH [   ]      GT [   ]    ALLERGIES:  No Known Allergies      Daily     Daily     VITALS:    Vital Signs Last 24 Hrs  T(C): 36.4 (06 Apr 2022 07:44), Max: 36.4 (05 Apr 2022 15:46)  T(F): 97.5 (06 Apr 2022 07:44), Max: 97.6 (06 Apr 2022 04:45)  HR: 50 (06 Apr 2022 10:07) (46 - 58)  BP: 109/50 (06 Apr 2022 10:07) (105/59 - 144/76)  BP(mean): --  RR: 18 (06 Apr 2022 07:44) (17 - 18)  SpO2: 94% (06 Apr 2022 10:07) (92% - 98%)    LABS:    CBC Full  -  ( 06 Apr 2022 07:24 )  WBC Count : 7.72 K/uL  RBC Count : 3.47 M/uL  Hemoglobin : 12.0 g/dL  Hematocrit : 35.3 %  Platelet Count - Automated : 271 K/uL  Mean Cell Volume : 101.7 fl  Mean Cell Hemoglobin : 34.6 pg  Mean Cell Hemoglobin Concentration : 34.0 gm/dL  Auto Neutrophil # : x  Auto Lymphocyte # : x  Auto Monocyte # : x  Auto Eosinophil # : x  Auto Basophil # : x  Auto Neutrophil % : x  Auto Lymphocyte % : x  Auto Monocyte % : x  Auto Eosinophil % : x  Auto Basophil % : x    PT/INR - ( 04 Apr 2022 22:12 )   PT: 12.2 sec;   INR: 1.02 ratio         PTT - ( 04 Apr 2022 22:12 )  PTT:32.9 sec  04-06    140  |  108  |  13  ----------------------------<  109<H>  4.3   |  25  |  0.90    Ca    8.7      06 Apr 2022 07:24  Phos  3.2     04-06  Mg     2.2     04-06    TPro  6.1  /  Alb  3.1<L>  /  TBili  0.4  /  DBili  x   /  AST  21  /  ALT  18  /  AlkPhos  63  04-05    CAPILLARY BLOOD GLUCOSE            LIVER FUNCTIONS - ( 05 Apr 2022 05:53 )  Alb: 3.1 g/dL / Pro: 6.1 g/dL / ALK PHOS: 63 U/L / ALT: 18 U/L DA / AST: 21 U/L / GGT: x           Creatinine Trend: 0.90<--, 1.24<--, 1.38<--  I&O's Summary    05 Apr 2022 07:01  -  06 Apr 2022 07:00  --------------------------------------------------------  IN: 0 mL / OUT: 1250 mL / NET: -1250 mL            Clean Catch Clean Catch (Midstream)  04-05 @ 06:29   <10,000 CFU/mL Normal Urogenital Lynda  --  --      .Blood Blood-Peripheral  04-05 @ 02:03   No growth to date.  --  --      .Blood Blood-Peripheral  04-05 @ 02:02   No growth to date.  --  --          MEDICATIONS:    MEDICATIONS  (STANDING):  aspirin  chewable 81 milliGRAM(s) Oral daily  atorvastatin 20 milliGRAM(s) Oral at bedtime  azithromycin  IVPB 500 milliGRAM(s) IV Intermittent every 24 hours  azithromycin  IVPB      baclofen 10 milliGRAM(s) Oral two times a day  budesonide 160 MICROgram(s)/formoterol 4.5 MICROgram(s) Inhaler 2 Puff(s) Inhalation two times a day  cefTRIAXone   IVPB 1000 milliGRAM(s) IV Intermittent every 24 hours  DULoxetine 30 milliGRAM(s) Oral two times a day  famotidine    Tablet 20 milliGRAM(s) Oral two times a day  finasteride 5 milliGRAM(s) Oral daily  gabapentin 600 milliGRAM(s) Oral three times a day  heparin   Injectable 5000 Unit(s) SubCutaneous every 8 hours  lactated ringers. 1000 milliLiter(s) (100 mL/Hr) IV Continuous <Continuous>  levothyroxine 25 MICROGram(s) Oral daily  LORazepam     Tablet 2 milliGRAM(s) Oral at bedtime  methylPREDNISolone sodium succinate Injectable 40 milliGRAM(s) IV Push every 12 hours  montelukast 10 milliGRAM(s) Oral daily  OXcarbazepine 300 milliGRAM(s) Oral two times a day  oxyCODONE    IR 15 milliGRAM(s) Oral three times a day  polyethylene glycol 3350 17 Gram(s) Oral daily  QUEtiapine 100 milliGRAM(s) Oral at bedtime  senna 2 Tablet(s) Oral at bedtime  tamsulosin 0.4 milliGRAM(s) Oral two times a day      MEDICATIONS  (PRN):  ALBUTerol    90 MICROgram(s) HFA Inhaler 2 Puff(s) Inhalation every 6 hours PRN Shortness of Breath and/or Wheezing      REVIEW OF SYSTEMS:                           ALL ROS DONE [ X   ]    CONSTITUTIONAL:  LETHARGIC [   ], FEVER [   ], UNRESPONSIVE [   ]  CVS:  CP  [   ], SOB, [   ], PALPITATIONS [   ], DIZZYNESS [   ]  RS: COUGH [   ], SPUTUM [   ]  GI: ABDOMINAL PAIN [   ], NAUSEA [   ], VOMITINGS [   ], DIARRHEA [   ], CONSTIPATION [   ]  :  DYSURIA [   ], NOCTURIA [   ], INCREASED FREQUENCY [   ], DRIBLING [   ],  SKELETAL: PAINFUL JOINTS [   ], SWOLLEN JOINTS [   ], NECK ACHE [   ], LOW BACK ACHE [   ],  SKIN : ULCERS [   ], RASH [   ], ITCHING [   ]  CNS: HEAD ACHE [   ], DOUBLE VISION [   ], BLURRED VISION [   ], AMS / CONFUSION [   ], SEIZURES [   ], WEAKNESS [   ],TINGLING / NUMBNESS [   ]      PHYSICAL EXAMINATION:  GENERAL APPEARANCE: NO DISTRESS  HEENT:  NO PALLOR, NO  JVD,  NO   NODES, NECK SUPPLE  CVS: S1 +, S2 +,   RS: AEEB,  OCCASIONAL  RALES +, wheezing+  ABD: SOFT, NT, NO, BS +  EXT: NO PE  SKIN: WARM,   SKELETAL:  ROM ACCEPTABLE  CNS:  AAO X  2-3    RADIOLOGY :    ACC: [id number] EXAM:  CT CHEST                        ACC: [id number] EXAM:  CT ABDOMEN AND PELVIS                          PROCEDURE DATE:  04/05/2022          INTERPRETATION:  CLINICAL INFORMATION: Elevated lipase. Altered mental   status. Evaluate for pancreatitis.    COMPARISON: CT abdomen pelvis 10/18/2013    CONTRAST/COMPLICATIONS:  IV Contrast: None  Oral Contrast: None  Complications: None reported    PROCEDURE:  CT of the Chest, Abdomen and Pelvis was performed.  Sagittal and coronal reformats were performed.    FINDINGS:    CHEST:  LUNGS AND LARGE AIRWAYS: Narrowing of anterior posterior dimension of the   trachea is noted, concerning for tracheomalacia. Central airways are   patent. Emphysema. Multifocal dependent lung parenchymal opacities most   prominent of right lower lobe concerning for multifocal pneumonia. There   is a 6 mm peripheral/subpleural nodule of the right middle lobe, image 96   series 3. Left lower lobe calcified granuloma.  PLEURA: No pleural effusion or pneumothorax.  VESSELS: Ectasia of the thoracic aorta, without overt aneurysm, and with   mild atheromatous change. Main pulmonary artery size is mildly enlarged,   3.1 cm.  HEART: Heart size qualitatively within normal limits. Aortic root/valve   calcification and coronary artery calcification. Trace pericardial fluid.  MEDIASTINUM AND LINDA: Small volume nodes. Patulous upper esophagus.   Moderate hiatal hernia.  CHEST WALL AND LOWER NECK: No chest wall hematoma. Visualized thyroid is   unremarkable.    ABDOMEN AND PELVIS:    Solid organ evaluation limited due to noncontrast technique.    LIVER: Liver size within normal limits. Peripheral low-attenuation at the   dome with few coarse calcifications, better seen on prior CT from 2013,   with interval evolution.  BILE DUCTS: No biliary distention  GALLBLADDER: Unremarkable CT appearance  SPLEEN: Splenules of the left upper quadrant.  PANCREAS: No significant peripancreatic inflammation or fluid. No main   ductal dilatation  ADRENALS: Unremarkable  KIDNEYS/URETERS: No hydronephrosis.    BLADDER: Mildly distended.  REPRODUCTIVE ORGANS: TURP defect of prostate.    BOWEL: Moderate hiatal hernia. Stomach is minimally distended. No small   bowel distention. Nondistended and noninflamed appendix. Mild stool   burden of the colon limits evaluation of the colonic mucosa.  PERITONEUM: No ascites. Slight nonspecific mesenteric haziness and a few   small volume mesenteric nodes.  VESSELS: Atheromatous changes. No abdominal aortic aneurysm.  RETROPERITONEUM/LYMPH NODES: Small volume nodes of the mesentery up to 6   mm.  ABDOMINAL WALL: Unremarkable  BONES: Left hip prosthesis. Sclerosis of the right femoral head.   Avascular necrosis versus other degenerative change can be considered.   Correlatewith pelvic MRI for further characterization. Old left rib   fractures. Right humeral head suture anchor.    IMPRESSION:    Chest:  1. Multifocal pneumonia. Follow to resolution with repeat chest CT in 6   weeks. A 6 mm right upper lobe peripheral/subpleural nodule can be   further evaluated on subsequent chest CT.  2. Emphysema and suggestion of tracheomalacia.  3. Coronary artery and aortic root/valve calcification.    Abdomen/Pelvis:  1. No CT evidence of acute pancreatitis.  2. Sclerosis of the right femoral head. Avascular necrosis versus other   degenerative change can be considered. Correlate with pelvic MRI for   further characterization.      ASSESSMENT :     Altered mental status    Calculus of Kidney    Tinea Versicolor    Depression    Anxiety    UTI (Urinary Tract Infection)    Current smoker    Rotator Cuff Tear Repair    percutaneous stone extraction - Left    Left Ureter Stent Placement        PLAN:  HPI:  Pt is a 67 yo M from [facility] with, PMH of HTN, DM, COPD, bipolar disorder, BPH sent to the ED for unresponsiveness. Based on history obtained from EMS patient was watching TV and he suddenly lost consciousness. GCS 3. When he came to the ED, he was back to baseline with GCS 15. No c/o chest pain, shortness of breath, fever, headache, dizziness, N/V, abdominal pain, urinary complaints. (05 Apr 2022 01:23)    # ACUTE ENCEPHALOPATHY VS. SYNCOPE - CURRENTLY AT BASELINE   - MONITOR ON TELEMETRY   - REVIEWED CT HEAD  - F/U ECHO  - F/U EEG  - ? ORTHOSTATIC BP, F/U ORTHOSTATIC VITALS, MED. REC INCLUDES MIDODRINE  - ? 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

## 2022-04-06 NOTE — PHYSICAL THERAPY INITIAL EVALUATION ADULT - GENERAL OBSERVATIONS, REHAB EVAL
[facility] resident refer in tele unit, adm dx episode of syncope, lack of sleep, ambulatory with rw on room air

## 2022-04-06 NOTE — PROGRESS NOTE ADULT - PROBLEM SELECTOR PLAN 3
On admission pts bladder scan showed more than 1000cc  Pt has history of BPH  Coude sinclair cathter placed in ED  Drained around 1200cc and it was clamped for sometime  monitor urine output  started on proscar and flomax  TOV tomorrow

## 2022-04-06 NOTE — CONSULT NOTE ADULT - SUBJECTIVE AND OBJECTIVE BOX
C A R D I O L O G Y  *********************    DATE OF SERVICE: 04-05-22    HISTORY OF PRESENT ILLNESS: HPI:  Pt is a 67 yo M from [facility] with, PMH of HTN, DM, COPD, bipolar disorder, BPH sent to the ED for unresponsiveness. Based on history obtained from EMS patient was watching TV and he suddenly lost consciousness. GCS 3. When he came to the ED, he was back to baseline with GCS 15. No c/o chest pain, shortness of breath, fever, headache, dizziness, N/V, abdominal pain, urinary complaints. Denies Palpitations.      PAST MEDICAL & SURGICAL HISTORY:  Calculus of Kidney    Tinea Versicolor    Depression    Anxiety    UTI (Urinary Tract Infection)    Current smoker    Rotator Cuff Tear Repair  right 2008    percutaneous stone extraction - Left  8/2011    Left Ureter Stent Placement  6/2011            MEDICATIONS:  MEDICATIONS  (STANDING):  aspirin  chewable 81 milliGRAM(s) Oral daily  atorvastatin 20 milliGRAM(s) Oral at bedtime  baclofen 10 milliGRAM(s) Oral two times a day  budesonide 160 MICROgram(s)/formoterol 4.5 MICROgram(s) Inhaler 2 Puff(s) Inhalation two times a day  cefTRIAXone   IVPB 1000 milliGRAM(s) IV Intermittent every 24 hours  DULoxetine 30 milliGRAM(s) Oral two times a day  famotidine    Tablet 20 milliGRAM(s) Oral two times a day  finasteride 5 milliGRAM(s) Oral daily  gabapentin 600 milliGRAM(s) Oral three times a day  heparin   Injectable 5000 Unit(s) SubCutaneous every 8 hours  lactated ringers. 1000 milliLiter(s) (100 mL/Hr) IV Continuous <Continuous>  levothyroxine 25 MICROGram(s) Oral daily  LORazepam     Tablet 2 milliGRAM(s) Oral at bedtime  montelukast 10 milliGRAM(s) Oral daily  OXcarbazepine 300 milliGRAM(s) Oral two times a day  oxyCODONE    IR 15 milliGRAM(s) Oral three times a day  polyethylene glycol 3350 17 Gram(s) Oral daily  QUEtiapine 100 milliGRAM(s) Oral at bedtime  senna 2 Tablet(s) Oral at bedtime  tamsulosin 0.4 milliGRAM(s) Oral two times a day      Allergies    No Known Allergies    Intolerances        FAMILY HISTORY:    Non-contributary for premature coronary disease or sudden cardiac death    SOCIAL HISTORY:    [ ] Non-smoker  [X ] Smoker  [ ] Alcohol    REVIEW OF SYSTEMS:  [ ]chest pain  [  ]shortness of breath  [  ]palpitations  [  ]syncope  [ ]near syncope [ ]upper extremity weakness   [ ] lower extremity weakness  [  ]diplopia  [  ]altered mental status   [  ]fevers  [ ]chills [ ]nausea  [ ]vomitting  [  ]dysphagia    [ ]abdominal pain  [ ]melena  [ ]BRBPR    [  ]epistaxis  [  ]rash    [ ]lower extremity edema        [X] All others negative	  [ ] Unable to obtain      LABS:	 	    CARDIAC MARKERS:        Troponin I, High Sensitivity Result: 9.7 ng/L (04-04-22 @ 22:12)                            11.4   6.30  )-----------( 242      ( 05 Apr 2022 05:53 )             34.6     Hb Trend: 11.4<--, 11.0<--    04-05    141  |  110<H>  |  15  ----------------------------<  116<H>  5.4<H>   |  30  |  1.24    Ca    8.6      05 Apr 2022 05:53  Phos  3.6     04-05  Mg     2.0     04-05    TPro  6.1  /  Alb  3.1<L>  /  TBili  0.4  /  DBili  x   /  AST  21  /  ALT  18  /  AlkPhos  63  04-05    Creatinine Trend: 1.24<--, 1.38<--    Coags:  PT/INR - ( 04 Apr 2022 22:12 )   PT: 12.2 sec;   INR: 1.02 ratio         PTT - ( 04 Apr 2022 22:12 )  PTT:32.9 sec    proBNP: Serum Pro-Brain Natriuretic Peptide: 678 pg/mL (04-04 @ 22:12)    Lipid Profile:   HgA1c:   TSH: Thyroid Stimulating Hormone, Serum: 0.42 uU/mL (04-05 @ 05:53)          PHYSICAL EXAM:  T(C): 36.3 (04-05-22 @ 07:28), Max: 37.1 (04-05-22 @ 00:15)  HR: 46 (04-05-22 @ 07:28) (45 - 90)  BP: 117/61 (04-05-22 @ 07:28) (89/51 - 132/82)  RR: 18 (04-05-22 @ 07:28) (16 - 24)  SpO2: 97% (04-05-22 @ 07:28) (97% - 100%)  Wt(kg): --   BMI (kg/m2): 27.1 (04-04-22 @ 21:46)  I&O's Summary    04 Apr 2022 07:01  -  05 Apr 2022 07:00  --------------------------------------------------------  IN: 0 mL / OUT: 1100 mL / NET: -1100 mL      HEENT:  (-)icterus (-)pallor  CV: N S1 S2 1/6 DAVID (+)2 Pulses B/l  Resp:  Scattered wheezesB/L, normal effort  GI: (+) BS Soft, NT, ND  Lymph:  (-)Edema, (-)obvious lymphadenopathy  Skin: Warm to touch, Normal turgor  Psych: Appropriate mood and affect        ECG:  	Sinus Shamir 53 BPM nonspecific T wave abnormality    RADIOLOGY:         CXR:   < from: Xray Chest 1 View- PORTABLE-Urgent (Xray Chest 1 View- PORTABLE-Urgent .) (01.23.21 @ 23:03) >  Clear lungs. Nopleural effusion or pneumothorax.    < end of copied text >      ASSESSMENT/PLAN: 	66y Male [facility] with, PMH of HTN, DM, COPD, bipolar disorder, BPH sent to the ED for unresponsiveness.    - cont tele  - Echo  - orthostatic BP.  Suspect he may have orthostasis as he is on midodrine  - Multifocal PNA, abx per Medical team  - will follow    I once again thank you for allowing me to participate in the care of your patient.  If you have any questions or concerns please do not hesitate to contact me.    Elmer Rapp MD, [facility]  BEEPER [phone]      
NEUROLOGY CONSULT NOTE    NAME:  MARNIE FRY      ASSESSMENT:  66 RHM with episode of unresponsiveness, likely and adverse effect from recent toxic exposure, less likely indicative of seizure given absence of seizure tendency on EEG, also with concern for mild cognitive impairment with memory loss      RECOMMENDATIONS:    - Check urine drug screen for toxic metabolites    - Check Vitamin B12, Folate, Methylmalonic acid, and Homocysteine levels to evaluate for deficiencies (Vitamin B12 level in January 2021 was at low end of normal range). Add a daily supplement if either Vitamin B12 level is low or if Methylmalonic acid or Homocysteine level is high (since these would indicate a functionally low Vitamin B12 level).    - No further neuroimaging indicated at this time    - Cardiovascular workup as per Cardiology consult    - DVT ppx: SCDs, Heparin    - Routine follow-up in Neurology clinic, to include cognitive evaluation      *******************************      CHIEF COMPLAINT:  Patient is a 66y old  Male who presents with a chief complaint of Unresponsiveness (06 Apr 2022 14:09)      HPI:  Pt is a 65 yo M from [facility] with, PMH of HTN, DM, COPD, bipolar disorder, BPH sent to the ED for unresponsiveness. Based on history obtained from EMS patient was watching TV and he suddenly lost consciousness. GCS 3. When he came to the ED, he was back to baseline with GCS 15. No c/o chest pain, shortness of breath, fever, headache, dizziness, N/V, abdominal pain, urinary complaints. (05 Apr 2022 01:23)      NEURO HPI:  66 RHM presents after having and episode of unresponsiveness with no recollection of the event or any preceding auras. He did not have any reported convulsions. He denies having any other episodes of loss of consciousness, but he recalls have an episode of feeling faint about 2.5 years ago, and underwent tilt table testing at [facility], which was unremarkable. He recounts having gummies containing cannabis shortly before his episode of unresponsiveness.      PAST MEDICAL & SURGICAL HISTORY:  Calculus of Kidney  Tinea Versicolor  Depression  Anxiety  UTI (Urinary Tract Infection)  Current smoker  Rotator Cuff Tear Repair, right 2008  percutaneous stone extraction - Left 8/2011  Left Ureter Stent Placement 6/2011      MEDICATIONS:  ALBUTerol    90 MICROgram(s) HFA Inhaler 2 Puff(s) Inhalation every 6 hours PRN  aspirin  chewable 81 milliGRAM(s) Oral daily  atorvastatin 20 milliGRAM(s) Oral at bedtime  azithromycin  IVPB 500 milliGRAM(s) IV Intermittent every 24 hours  azithromycin  IVPB      baclofen 10 milliGRAM(s) Oral two times a day  budesonide 160 MICROgram(s)/formoterol 4.5 MICROgram(s) Inhaler 2 Puff(s) Inhalation two times a day  cefTRIAXone   IVPB 1000 milliGRAM(s) IV Intermittent every 24 hours  DULoxetine 30 milliGRAM(s) Oral two times a day  famotidine    Tablet 20 milliGRAM(s) Oral two times a day  finasteride 5 milliGRAM(s) Oral daily  gabapentin 600 milliGRAM(s) Oral three times a day  heparin   Injectable 5000 Unit(s) SubCutaneous every 8 hours  lactated ringers. 1000 milliLiter(s) IV Continuous <Continuous>  levothyroxine 25 MICROGram(s) Oral daily  LORazepam     Tablet 2 milliGRAM(s) Oral at bedtime  methylPREDNISolone sodium succinate Injectable 40 milliGRAM(s) IV Push every 12 hours  montelukast 10 milliGRAM(s) Oral daily  OXcarbazepine 300 milliGRAM(s) Oral two times a day  oxyCODONE    IR 15 milliGRAM(s) Oral three times a day  polyethylene glycol 3350 17 Gram(s) Oral daily  QUEtiapine 100 milliGRAM(s) Oral at bedtime  senna 2 Tablet(s) Oral at bedtime  tamsulosin 0.4 milliGRAM(s) Oral two times a day      ALLERGIES:  No Known Allergies      FAMILY HISTORY:  No reported family history of seizures      SOCIAL HISTORY:  Smokes 2 packs per week, down from 3-4 packs per week, started t age 18  Denies alcohol use  Other drug use as in HPI      REVIEW OF SYSTEMS:  GENERAL: No fever, weight changes, fatigue  EYES: No eye pain or discharge  EAR/NOSE/MOUTH/THROAT: No sinus or throat pain; No difficulty hearing  NECK: No pain or stiffness  RESPIRATORY: No cough, wheezing, chills, or hemoptysis  CARDIOVASCULAR: No chest pain, palpitations, shortness of breath, or dyspnea on exertion  GASTROINTESTINAL: No abdominal pain, nausea, vomiting, hematemesis, diarrhea, or constipation  GENITOURINARY: No dysuria, frequency, hematuria, or incontinence  SKIN: No rashes or lesions  ENDOCRINE: No heat or cold intolerance  HEMATOLOGIC: No easy bruising or bleeding  PSYCHIATRIC: H/o depression, anxiety, and bipolar disorder  MUSCULOSKELETAL: No joint pain or swelling  NEUROLOGICAL: As per HPI        OBJECTIVE:    Vital Signs Last 24 Hrs  T(C): 37 (06 Apr 2022 23:22), Max: 37 (06 Apr 2022 23:22)  T(F): 98.6 (06 Apr 2022 23:22), Max: 98.6 (06 Apr 2022 23:22)  HR: 58 (06 Apr 2022 23:22) (48 - 60)  BP: 122/71 (06 Apr 2022 23:22) (106/49 - 132/68)  RR: 20 (06 Apr 2022 23:22) (17 - 20)  SpO2: 96% (06 Apr 2022 23:22) (94% - 98%)    General Examination:  General: No acute distress  HEENT: Atraumatic, Normocephalic  Respiratory: CTA B/l.  No crackles, rhonchi, or wheezes.  Cardiovascular: RRR.  Normal S1 & S2.  Normal b/l radial and pedal pulses.    Neurological Examination:  General / Mental Status: AAO x 2 (knows month and year, but not date).  No aphasia or dysarthria.  Naming and repetition intact.  Immediate recall: 3/3.  5-minute delayed recall: 1/3 (recalls 1 word with category cue and 1 word with MCQ cue).  Cranial Nerves: VFF x 4.  PERRL.  EOMI x 2, No nystagmus or diplopia.  B/l V1-V3 equal and intact to light touch and pinprick.  Symmetric facial movement and palate elevation.  B/l hearing equal to finger rub.  5/5 strength with b/l sternocleidomastoid & trapezius.  Midline tongue protrusion, with no atrophy or fasciculations.  Motor: Normal bulk & tone in all four extremities.  5/5 strength throughout all four extremities.  No downward drift, rigidity, spasticity, or tremors in any of the four extremities.  Sensory: Intact to light touch and pinprick in all four extremities.  Reflex: 2+ and symmetric at b/l biceps, triceps, brachioradialis, patellae, and ankles.  Downgoing toes b/l.  Coordination: No dysmetria with b/l finger-to-nose and heel raise tests.  Symmetric rapid alternating movements b/l.  Gait and Romberg sign testing deferred per patient preference.    NIHSS 0  MRS 1      LABORATORY VALUES:                        12.0   7.72  )-----------( 271      ( 06 Apr 2022 07:24 )             35.3       04-06    140  |  108  |  13  ----------------------------<  109<H>  4.3   |  25  |  0.90    Ca    8.7      06 Apr 2022 07:24  Phos  3.2     04-06  Mg     2.2     04-06    TPro  6.1  /  Alb  3.1<L>  /  TBili  0.4  /  DBili  x   /  AST  21  /  ALT  18  /  AlkPhos  63  04-05    04-05 Chol 143 LDL 81 HDL 44 Trig 92    A1C with Estimated Average Glucose (04.05.22 @ 10:14)   A1C with Estimated Average Glucose Result: 6.0%   Estimated Average Glucose: 126 mg/dL     Thyroid Stimulating Hormone, Serum (04.05.22 @ 05:53)   Thyroid Stimulating Hormone, Serum: 0.42 uU/mL         NEUROIMAGING:      CT Head (4/5/22):  - No acute intracranial abnormality  - Chronic left basal ganglia lacunar infarct  - Moderate chronic microvascular changes  - Incidental left external auditory canal metallic foreign body      Routine EEG (4/6/22):  - Moderate generalized slowing  - Incidental sinus bradycardia  - No seizure tendency identified          Please contact the Neurology consult service with any neurological questions.    Baldo Hunter MD   of Neurology  [facility] [employer] [facility]

## 2022-04-06 NOTE — PHYSICAL THERAPY INITIAL EVALUATION ADULT - RANGE OF MOTION EXAMINATION, REHAB EVAL
left hip 0-90 flexion/bilateral upper extremity ROM was WNL (within normal limits)/Right LE ROM was WFL (within functional limits)

## 2022-04-07 ENCOUNTER — TRANSCRIPTION ENCOUNTER (OUTPATIENT)
Age: 67
End: 2022-04-07

## 2022-04-07 LAB
AMPHET UR-MCNC: NEGATIVE — SIGNIFICANT CHANGE UP
ANION GAP SERPL CALC-SCNC: 7 MMOL/L — SIGNIFICANT CHANGE UP (ref 5–17)
BARBITURATES UR SCN-MCNC: NEGATIVE — SIGNIFICANT CHANGE UP
BENZODIAZ UR-MCNC: NEGATIVE — SIGNIFICANT CHANGE UP
BUN SERPL-MCNC: 18 MG/DL — SIGNIFICANT CHANGE UP (ref 7–18)
CALCIUM SERPL-MCNC: 8.6 MG/DL — SIGNIFICANT CHANGE UP (ref 8.4–10.5)
CHLORIDE SERPL-SCNC: 106 MMOL/L — SIGNIFICANT CHANGE UP (ref 96–108)
CO2 SERPL-SCNC: 24 MMOL/L — SIGNIFICANT CHANGE UP (ref 22–31)
COCAINE METAB.OTHER UR-MCNC: POSITIVE
CREAT SERPL-MCNC: 0.95 MG/DL — SIGNIFICANT CHANGE UP (ref 0.5–1.3)
EGFR: 89 ML/MIN/1.73M2 — SIGNIFICANT CHANGE UP
FOLATE SERPL-MCNC: 8.4 NG/ML — SIGNIFICANT CHANGE UP
GLUCOSE SERPL-MCNC: 115 MG/DL — HIGH (ref 70–99)
HCT VFR BLD CALC: 35.3 % — LOW (ref 39–50)
HCYS SERPL-MCNC: 10.8 UMOL/L — SIGNIFICANT CHANGE UP
HGB BLD-MCNC: 12.1 G/DL — LOW (ref 13–17)
MCHC RBC-ENTMCNC: 34 PG — SIGNIFICANT CHANGE UP (ref 27–34)
MCHC RBC-ENTMCNC: 34.3 GM/DL — SIGNIFICANT CHANGE UP (ref 32–36)
MCV RBC AUTO: 99.2 FL — SIGNIFICANT CHANGE UP (ref 80–100)
METHADONE UR-MCNC: NEGATIVE — SIGNIFICANT CHANGE UP
NRBC # BLD: 0 /100 WBCS — SIGNIFICANT CHANGE UP (ref 0–0)
OPIATES UR-MCNC: NEGATIVE — SIGNIFICANT CHANGE UP
PCP SPEC-MCNC: SIGNIFICANT CHANGE UP
PCP UR-MCNC: NEGATIVE — SIGNIFICANT CHANGE UP
PLATELET # BLD AUTO: 275 K/UL — SIGNIFICANT CHANGE UP (ref 150–400)
POTASSIUM SERPL-MCNC: 4.2 MMOL/L — SIGNIFICANT CHANGE UP (ref 3.5–5.3)
POTASSIUM SERPL-SCNC: 4.2 MMOL/L — SIGNIFICANT CHANGE UP (ref 3.5–5.3)
RBC # BLD: 3.56 M/UL — LOW (ref 4.2–5.8)
RBC # FLD: 13.3 % — SIGNIFICANT CHANGE UP (ref 10.3–14.5)
SODIUM SERPL-SCNC: 137 MMOL/L — SIGNIFICANT CHANGE UP (ref 135–145)
THC UR QL: POSITIVE
VIT B12 SERPL-MCNC: 410 PG/ML — SIGNIFICANT CHANGE UP (ref 232–1245)
WBC # BLD: 9.98 K/UL — SIGNIFICANT CHANGE UP (ref 3.8–10.5)
WBC # FLD AUTO: 9.98 K/UL — SIGNIFICANT CHANGE UP (ref 3.8–10.5)

## 2022-04-07 PROCEDURE — 99233 SBSQ HOSP IP/OBS HIGH 50: CPT

## 2022-04-07 RX ORDER — AZITHROMYCIN 500 MG/1
1 TABLET, FILM COATED ORAL
Qty: 5 | Refills: 0
Start: 2022-04-07 | End: 2022-04-11

## 2022-04-07 RX ORDER — SODIUM CHLORIDE 9 MG/ML
500 INJECTION INTRAMUSCULAR; INTRAVENOUS; SUBCUTANEOUS ONCE
Refills: 0 | Status: DISCONTINUED | OUTPATIENT
Start: 2022-04-07 | End: 2022-04-07

## 2022-04-07 RX ORDER — MULTIVIT WITH MIN/MFOLATE/K2 340-15/3 G
1 POWDER (GRAM) ORAL ONCE
Refills: 0 | Status: COMPLETED | OUTPATIENT
Start: 2022-04-07 | End: 2022-04-07

## 2022-04-07 RX ORDER — MULTIVIT WITH MIN/MFOLATE/K2 340-15/3 G
30 POWDER (GRAM) ORAL
Qty: 0 | Refills: 0 | DISCHARGE
Start: 2022-04-07

## 2022-04-07 RX ORDER — FINASTERIDE 5 MG/1
1 TABLET, FILM COATED ORAL
Qty: 30 | Refills: 0
Start: 2022-04-07 | End: 2022-05-06

## 2022-04-07 RX ORDER — CEFUROXIME AXETIL 250 MG
1 TABLET ORAL
Qty: 10 | Refills: 0
Start: 2022-04-07 | End: 2022-04-11

## 2022-04-07 RX ADMIN — QUETIAPINE FUMARATE 100 MILLIGRAM(S): 200 TABLET, FILM COATED ORAL at 21:18

## 2022-04-07 RX ADMIN — Medication 40 MILLIGRAM(S): at 06:26

## 2022-04-07 RX ADMIN — TAMSULOSIN HYDROCHLORIDE 0.4 MILLIGRAM(S): 0.4 CAPSULE ORAL at 17:26

## 2022-04-07 RX ADMIN — OXYCODONE HYDROCHLORIDE 15 MILLIGRAM(S): 5 TABLET ORAL at 14:10

## 2022-04-07 RX ADMIN — GABAPENTIN 600 MILLIGRAM(S): 400 CAPSULE ORAL at 06:22

## 2022-04-07 RX ADMIN — AZITHROMYCIN 255 MILLIGRAM(S): 500 TABLET, FILM COATED ORAL at 17:27

## 2022-04-07 RX ADMIN — Medication 1 BOTTLE: at 14:40

## 2022-04-07 RX ADMIN — Medication 40 MILLIGRAM(S): at 17:27

## 2022-04-07 RX ADMIN — DULOXETINE HYDROCHLORIDE 30 MILLIGRAM(S): 30 CAPSULE, DELAYED RELEASE ORAL at 06:21

## 2022-04-07 RX ADMIN — OXYCODONE HYDROCHLORIDE 15 MILLIGRAM(S): 5 TABLET ORAL at 21:49

## 2022-04-07 RX ADMIN — ATORVASTATIN CALCIUM 20 MILLIGRAM(S): 80 TABLET, FILM COATED ORAL at 21:19

## 2022-04-07 RX ADMIN — CEFTRIAXONE 100 MILLIGRAM(S): 500 INJECTION, POWDER, FOR SOLUTION INTRAMUSCULAR; INTRAVENOUS at 17:35

## 2022-04-07 RX ADMIN — FAMOTIDINE 20 MILLIGRAM(S): 10 INJECTION INTRAVENOUS at 06:22

## 2022-04-07 RX ADMIN — HEPARIN SODIUM 5000 UNIT(S): 5000 INJECTION INTRAVENOUS; SUBCUTANEOUS at 06:23

## 2022-04-07 RX ADMIN — BUDESONIDE AND FORMOTEROL FUMARATE DIHYDRATE 2 PUFF(S): 160; 4.5 AEROSOL RESPIRATORY (INHALATION) at 22:10

## 2022-04-07 RX ADMIN — GABAPENTIN 600 MILLIGRAM(S): 400 CAPSULE ORAL at 13:41

## 2022-04-07 RX ADMIN — Medication 2 MILLIGRAM(S): at 21:18

## 2022-04-07 RX ADMIN — GABAPENTIN 600 MILLIGRAM(S): 400 CAPSULE ORAL at 21:18

## 2022-04-07 RX ADMIN — HEPARIN SODIUM 5000 UNIT(S): 5000 INJECTION INTRAVENOUS; SUBCUTANEOUS at 13:41

## 2022-04-07 RX ADMIN — OXYCODONE HYDROCHLORIDE 15 MILLIGRAM(S): 5 TABLET ORAL at 21:18

## 2022-04-07 RX ADMIN — FAMOTIDINE 20 MILLIGRAM(S): 10 INJECTION INTRAVENOUS at 17:26

## 2022-04-07 RX ADMIN — Medication 10 MILLIGRAM(S): at 06:22

## 2022-04-07 RX ADMIN — OXYCODONE HYDROCHLORIDE 15 MILLIGRAM(S): 5 TABLET ORAL at 13:41

## 2022-04-07 RX ADMIN — Medication 10 MILLIGRAM(S): at 17:26

## 2022-04-07 RX ADMIN — TAMSULOSIN HYDROCHLORIDE 0.4 MILLIGRAM(S): 0.4 CAPSULE ORAL at 06:21

## 2022-04-07 RX ADMIN — OXYCODONE HYDROCHLORIDE 15 MILLIGRAM(S): 5 TABLET ORAL at 07:00

## 2022-04-07 RX ADMIN — HEPARIN SODIUM 5000 UNIT(S): 5000 INJECTION INTRAVENOUS; SUBCUTANEOUS at 21:19

## 2022-04-07 RX ADMIN — OXCARBAZEPINE 300 MILLIGRAM(S): 300 TABLET, FILM COATED ORAL at 17:26

## 2022-04-07 RX ADMIN — OXCARBAZEPINE 300 MILLIGRAM(S): 300 TABLET, FILM COATED ORAL at 06:22

## 2022-04-07 RX ADMIN — OXYCODONE HYDROCHLORIDE 15 MILLIGRAM(S): 5 TABLET ORAL at 06:22

## 2022-04-07 RX ADMIN — POLYETHYLENE GLYCOL 3350 17 GRAM(S): 17 POWDER, FOR SOLUTION ORAL at 13:44

## 2022-04-07 RX ADMIN — Medication 81 MILLIGRAM(S): at 13:42

## 2022-04-07 RX ADMIN — DULOXETINE HYDROCHLORIDE 30 MILLIGRAM(S): 30 CAPSULE, DELAYED RELEASE ORAL at 17:26

## 2022-04-07 RX ADMIN — BUDESONIDE AND FORMOTEROL FUMARATE DIHYDRATE 2 PUFF(S): 160; 4.5 AEROSOL RESPIRATORY (INHALATION) at 13:46

## 2022-04-07 RX ADMIN — MONTELUKAST 10 MILLIGRAM(S): 4 TABLET, CHEWABLE ORAL at 13:45

## 2022-04-07 RX ADMIN — FINASTERIDE 5 MILLIGRAM(S): 5 TABLET, FILM COATED ORAL at 13:44

## 2022-04-07 RX ADMIN — Medication 25 MICROGRAM(S): at 06:23

## 2022-04-07 NOTE — PROGRESS NOTE ADULT - SUBJECTIVE AND OBJECTIVE BOX
NEUROLOGY FOLLOW-UP NOTE    NAME:  MARNIE FRY      ASSESSMENT:        RECOMMENDATIONS:          NOTE TO BE COMPLETED - PLEASE REFER TO ABOVE ONLY AND IGNORE INFORMATION BELOW    ******************************    HPI:  Pt is a 65 yo M from [facility] with, PMH of HTN, DM, COPD, bipolar disorder, BPH sent to the ED for unresponsiveness. Based on history obtained from EMS patient was watching TV and he suddenly lost consciousness. GCS 3. When he came to the ED, he was back to baseline with GCS 15. No c/o chest pain, shortness of breath, fever, headache, dizziness, N/V, abdominal pain, urinary complaints. (05 Apr 2022 01:23)      NEURO HPI:      INTERVAL HISTORY:      MEDICATIONS:  ALBUTerol    90 MICROgram(s) HFA Inhaler 2 Puff(s) Inhalation every 6 hours PRN  aspirin  chewable 81 milliGRAM(s) Oral daily  atorvastatin 20 milliGRAM(s) Oral at bedtime  azithromycin  IVPB 500 milliGRAM(s) IV Intermittent every 24 hours  azithromycin  IVPB      baclofen 10 milliGRAM(s) Oral two times a day  budesonide 160 MICROgram(s)/formoterol 4.5 MICROgram(s) Inhaler 2 Puff(s) Inhalation two times a day  cefTRIAXone   IVPB 1000 milliGRAM(s) IV Intermittent every 24 hours  DULoxetine 30 milliGRAM(s) Oral two times a day  famotidine    Tablet 20 milliGRAM(s) Oral two times a day  finasteride 5 milliGRAM(s) Oral daily  gabapentin 600 milliGRAM(s) Oral three times a day  heparin   Injectable 5000 Unit(s) SubCutaneous every 8 hours  lactated ringers. 1000 milliLiter(s) IV Continuous <Continuous>  levothyroxine 25 MICROGram(s) Oral daily  LORazepam     Tablet 2 milliGRAM(s) Oral at bedtime  methylPREDNISolone sodium succinate Injectable 40 milliGRAM(s) IV Push every 12 hours  montelukast 10 milliGRAM(s) Oral daily  OXcarbazepine 300 milliGRAM(s) Oral two times a day  oxyCODONE    IR 15 milliGRAM(s) Oral three times a day  polyethylene glycol 3350 17 Gram(s) Oral daily  QUEtiapine 100 milliGRAM(s) Oral at bedtime  senna 2 Tablet(s) Oral at bedtime  tamsulosin 0.4 milliGRAM(s) Oral two times a day      ALLERGIES:  No Known Allergies      REVIEW OF SYSTEMS:  Fourteen systems reviewed and negative except as in HPI / Interval History.        OBJECTIVE:  Vital Signs Last 24 Hrs  T(C): 36.6 (07 Apr 2022 23:30), Max: 37.1 (07 Apr 2022 20:07)  T(F): 97.9 (07 Apr 2022 23:30), Max: 98.7 (07 Apr 2022 20:07)  HR: 59 (07 Apr 2022 23:30) (57 - 72)  BP: 117/62 (07 Apr 2022 23:30) (114/63 - 136/60)  BP(mean): --  RR: 20 (07 Apr 2022 23:30) (18 - 20)  SpO2: 95% (07 Apr 2022 23:30) (93% - 97%)    General Examination:  General: No acute distress  HEENT: Atraumatic, Normocephalic  Respiratory: CTA B/l.  No crackles, rhonchi, or wheezes.  Cardiovascular: RRR.  Normal S1 & S2.  Normal b/l radial and pedal pulses.    Neurological Examination:  General / Mental Status: AAO x 3.  No aphasia or dysarthria.  Naming and repetition intact.  Cranial Nerves: VFF x 4.  PERRL.  EOMI x 2, No nystagmus or diplopia.  B/l V1-V3 equal and intact to light touch and pinprick.  Symmetric facial movement and palate elevation.  B/l hearing equal to finger rub.  5/5 strength with b/l sternocleidomastoid & trapezius.  Midline tongue protrusion, with no atrophy or fasciculations.  Motor: Normal bulk & tone in all four extremities.  5/5 strength throughout all four extremities.  No downward drift, rigidity, spasticity, or tremors in any of the four extremities.  Sensory: Intact to light touch and pinprick in all four extremities.  Negative Romberg.  Reflex: 2+ and symmetric at b/l biceps, triceps, brachioradialis, patellae, and ankles.  Downgoing toes b/l.  Coordination: No dysmetria with b/l finger-to-nose and heel raise tests.  Symmetric rapid alternating movements b/l.  Gait: Normal, narrow-based gait.  No difficulty with tiptoe, heel, and tandem gaits.        LABORATORY VALUES:                          12.1   9.98  )-----------( 275      ( 07 Apr 2022 06:56 )             35.3       04-07    137  |  106  |  18  ----------------------------<  115<H>  4.2   |  24  |  0.95    Ca    8.6      07 Apr 2022 06:56  Phos  3.2     04-06  Mg     2.2     04-06 04-05 Chol 143 LDL -- HDL 44 Trig 92    Glucose Trend  04-07-22 @ 06:56   -  -- 115<H> --  04-06-22 @ 07:24   -  -- 109<H> --  04-05-22 @ 05:53   -  -- 116<H> --            Vitamin B12, Serum: 410 pg/mL (04-07-22 @ 18:21)  Folate, Serum: 8.4 ng/mL (04-07-22 @ 18:21)          NEUROIMAGING:          Please contact the Neurology consult service with any neurological questions.      Baldo Hunter MD   of Neurology  [facility] at [facility]         NEUROLOGY FOLLOW-UP NOTE    NAME:  MARNIE FRY      ASSESSMENT:  66 RHM with episode of unresponsiveness, likely and adverse effect from recent toxic exposure, less likely indicative of seizure given absence of seizure tendency on EEG, also with concern for mild cognitive impairment with memory loss      RECOMMENDATIONS:    - Check urine drug screen for toxic metabolites    - Check Vitamin B12, Folate, Methylmalonic acid, and Homocysteine levels to evaluate for deficiencies (Vitamin B12 level in January 2021 was at low end of normal range). Add a daily supplement if either Vitamin B12 level is low or if Methylmalonic acid or Homocysteine level is high (since these would indicate a functionally low Vitamin B12 level).    - No further neuroimaging indicated at this time    - Cardiovascular workup as per Cardiology consult    - DVT ppx: SCDs, Heparin    - Routine follow-up in Neurology clinic, to include cognitive evaluation        ******************************    HPI:  Pt is a 65 yo M from [facility] with, PMH of HTN, DM, COPD, bipolar disorder, BPH sent to the ED for unresponsiveness. Based on history obtained from EMS patient was watching TV and he suddenly lost consciousness. GCS 3. When he came to the ED, he was back to baseline with GCS 15. No c/o chest pain, shortness of breath, fever, headache, dizziness, N/V, abdominal pain, urinary complaints. (05 Apr 2022 01:23)      NEURO HPI:  66 RHM presents after having and episode of unresponsiveness with no recollection of the event or any preceding auras. He did not have any reported convulsions. He denies having any other episodes of loss of consciousness, but he recalls have an episode of feeling faint about 2.5 years ago, and underwent tilt table testing at [facility], which was unremarkable. He recounts having gummies containing cannabis shortly before his episode of unresponsiveness.      INTERVAL HISTORY:  The patient has not had any new episodes of loss of consciousness overnight.      MEDICATIONS:  ALBUTerol    90 MICROgram(s) HFA Inhaler 2 Puff(s) Inhalation every 6 hours PRN  aspirin  chewable 81 milliGRAM(s) Oral daily  atorvastatin 20 milliGRAM(s) Oral at bedtime  azithromycin  IVPB 500 milliGRAM(s) IV Intermittent every 24 hours  azithromycin  IVPB      baclofen 10 milliGRAM(s) Oral two times a day  budesonide 160 MICROgram(s)/formoterol 4.5 MICROgram(s) Inhaler 2 Puff(s) Inhalation two times a day  cefTRIAXone   IVPB 1000 milliGRAM(s) IV Intermittent every 24 hours  DULoxetine 30 milliGRAM(s) Oral two times a day  famotidine    Tablet 20 milliGRAM(s) Oral two times a day  finasteride 5 milliGRAM(s) Oral daily  gabapentin 600 milliGRAM(s) Oral three times a day  heparin   Injectable 5000 Unit(s) SubCutaneous every 8 hours  lactated ringers. 1000 milliLiter(s) IV Continuous <Continuous>  levothyroxine 25 MICROGram(s) Oral daily  LORazepam     Tablet 2 milliGRAM(s) Oral at bedtime  methylPREDNISolone sodium succinate Injectable 40 milliGRAM(s) IV Push every 12 hours  montelukast 10 milliGRAM(s) Oral daily  OXcarbazepine 300 milliGRAM(s) Oral two times a day  oxyCODONE    IR 15 milliGRAM(s) Oral three times a day  polyethylene glycol 3350 17 Gram(s) Oral daily  QUEtiapine 100 milliGRAM(s) Oral at bedtime  senna 2 Tablet(s) Oral at bedtime  tamsulosin 0.4 milliGRAM(s) Oral two times a day      ALLERGIES:  No Known Allergies      REVIEW OF SYSTEMS:  Fourteen systems reviewed and negative except as in HPI / Interval History.        OBJECTIVE:  Vital Signs Last 24 Hrs  T(C): 36.6 (07 Apr 2022 23:30), Max: 37.1 (07 Apr 2022 20:07)  T(F): 97.9 (07 Apr 2022 23:30), Max: 98.7 (07 Apr 2022 20:07)  HR: 59 (07 Apr 2022 23:30) (57 - 72)  BP: 117/62 (07 Apr 2022 23:30) (114/63 - 136/60)  RR: 20 (07 Apr 2022 23:30) (18 - 20)  SpO2: 95% (07 Apr 2022 23:30) (93% - 97%)    General Examination:  General: No acute distress  HEENT: Atraumatic, Normocephalic  Respiratory: CTA B/l.  No crackles, rhonchi, or wheezes.  Cardiovascular: Low heart rate, Regular rhythm.  Normal S1 & S2.  Normal b/l radial and pedal pulses.    Neurological Examination:  General / Mental Status: AAO x 2 (knows month and year, but not date).  No aphasia or dysarthria.  Naming and repetition intact.  Immediate recall: 3/3.  5-minute delayed recall: 1/3 (recalls 1 word only with MCQ cue).  Cranial Nerves: VFF x 4.  PERRL.  EOMI x 2, No nystagmus or diplopia.  B/l V1-V3 equal and intact to light touch and pinprick.  Symmetric facial movement and palate elevation.  B/l hearing equal to finger rub.  5/5 strength with b/l sternocleidomastoid & trapezius.  Midline tongue protrusion, with no atrophy or fasciculations.  Motor: Normal bulk & tone in all four extremities.  5/5 strength throughout all four extremities.  No downward drift, rigidity, spasticity, or tremors in any of the four extremities.  Sensory: Intact to light touch and pinprick in all four extremities.  Reflex: 2+ and symmetric at b/l biceps, triceps, brachioradialis, patellae, and ankles.  Downgoing toes b/l.  Coordination: No dysmetria with b/l finger-to-nose and heel raise tests.  Symmetric rapid alternating movements b/l.  Gait and Romberg sign testing deferred per patient preference.    NIHSS 0  MRS 1        LABORATORY VALUES:                          12.1   9.98  )-----------( 275      ( 07 Apr 2022 06:56 )             35.3       04-07    137  |  106  |  18  ----------------------------<  115<H>  4.2   |  24  |  0.95    Ca    8.6      07 Apr 2022 06:56  Phos  3.2     04-06  Mg     2.2     04-06 04-05 Chol 143 LDL 81 HDL 44 Trig 92    Glucose Trend  04-07-22 @ 06:56   -  -- 115<H> --  04-06-22 @ 07:24   -  -- 109<H> --  04-05-22 @ 05:53   -  -- 116<H> --    Vitamin B12, Serum: 410 pg/mL (04-07-22 @ 18:21)  Folate, Serum: 8.4 ng/mL (04-07-22 @ 18:21)    A1C with Estimated Average Glucose (04.05.22 @ 10:14)   A1C with Estimated Average Glucose Result: 6.0%   Estimated Average Glucose: 126 mg/dL     Thyroid Stimulating Hormone, Serum (04.05.22 @ 05:53)   Thyroid Stimulating Hormone, Serum: 0.42 uU/mL     Urine Drug Screen (4/7/22) Positive for THC and Cocaine Metabolites        NEUROIMAGING:      CT Head (4/5/22):  - No acute intracranial abnormality  - Chronic left basal ganglia lacunar infarct  - Moderate chronic microvascular changes  - Incidental left external auditory canal metallic foreign body      Routine EEG (4/6/22):  - Moderate generalized slowing  - Incidental sinus bradycardia  - No seizure tendency identified          Please contact the Neurology consult service with any neurological questions.      Baldo Hunter MD   of Neurology  [facility] at [facility]

## 2022-04-07 NOTE — DISCHARGE NOTE PROVIDER - NSDCCAREPROVSEEN_GEN_ALL_CORE_FT
Karen, Yanick Villasenor, Charu Rapp, Elmer Mauricio, Jackie Mota, Angie Diggs, Josh Recinos, Allan Kirkland, Ilya Argueta, Johnathan Hunter, Baldo Alvarado, Sandor Alvarado, [facility]

## 2022-04-07 NOTE — PROGRESS NOTE ADULT - PROBLEM SELECTOR PLAN 4
Pt has h/o HTN  started on home meds of losartan with parameters On admission pts bladder scan showed more than 1000cc  Pt has history of BPH  Coude sinclair cathter placed in ED  Drained around 1200cc and it was clamped for sometime  monitor urine output  started on proscar and flomax  TOV today

## 2022-04-07 NOTE — PROGRESS NOTE ADULT - PROBLEM SELECTOR PLAN 1
Pt was sent from NH for unresponsiveness, GCS score of 3  In ED, GCS improved to 15  Vitals 89/51 mmHg, HR 60s, afebrile, 90% on RA  EKG showed sinus bradycardia of 53  Blood glucose 128  Electrolytes wnl  CT head showed No acute intracranial CT abnormality.  Patient likely has baseline orthostatics given that he's on midodrine though orthostatics negative but did not get standing up, f/u repeat orthostatic  Admitted to telemetry, shows bradycardia to the 40s  f/u Echocardiogram, f/u stress test  Utox showed THC + cocaine, patient endorses taking edible marijuana before episode  Syncopal episode likely 2/2 marijuana use, however given bradycardia, patient going for stress test today  Cardiology Dr. Rapp consulted Pt was sent from NH for unresponsiveness, GCS score of 3  In ED, GCS improved to 15  Vitals 89/51 mmHg, HR 60s, afebrile, 90% on RA  EKG showed sinus bradycardia of 53  Blood glucose 128  Electrolytes wnl  CT head showed No acute intracranial CT abnormality.  Patient likely has baseline orthostatics given that he's on midodrine though orthostatic negative here (after fluids)  Cardiology Dr. Rapp consulted, not likely cardiac in origin. Patient bradycardic but has appropriate increase in HR on treadmill, echo and stress normal  Utox showed THC + cocaine, patient endorses taking edible marijuana before episode  Likely 2/2 multifocal PNA vs 2/2 drug use, patient counseled

## 2022-04-07 NOTE — PROGRESS NOTE ADULT - PROBLEM SELECTOR PLAN 2
CT chest showed multifocal pneumonia, patient with cough  On 2L NC sating 98%  c/w azithromycinm rocephin  c/w IVF  Downtitrated O2 as tolerated CT chest showed multifocal pneumonia, patient with cough  On 2L NC sating 98%  c/w azithromycinm rocephin  c/w IVF  Pt of O2

## 2022-04-07 NOTE — PROGRESS NOTE ADULT - PROBLEM SELECTOR PLAN 3
On admission pts bladder scan showed more than 1000cc  Pt has history of BPH  Coude sinclair cathter placed in ED  Drained around 1200cc and it was clamped for sometime  monitor urine output  started on proscar and flomax  TOV today Admitted to telemetry, shows bradycardia to the 40s  Echo and stress test normal  Pt had appropriate increase in HR on treadmill

## 2022-04-07 NOTE — PROGRESS NOTE ADULT - TIME BILLING
86 I counseled the patient about his test results, and the appropriate follow-up for further evaluation of memory loss.

## 2022-04-07 NOTE — DISCHARGE NOTE PROVIDER - NSDCCPCAREPLAN_GEN_ALL_CORE_FT
PRINCIPAL DISCHARGE DIAGNOSIS  Diagnosis: Multifocal pneumonia  Assessment and Plan of Treatment: You presented unresponsive and with sepsis (severe infection). You were treated with IV antibiotics, bronchodilators, and intravenous fluids. You responded well to this therapy. You were initially requiring O2 support with 2L nasal canula but you were eventually titrated off. At time of discharge you are saturating well on room air. You will be discharged with oral antibiotics. You will be discharged with azithromycin 500mg daily for 5 days, and Ceftin 250mg twice a day for 7 days. You will also be discharged on a steroid taper. Please take as prescribed and follow-up with your PCP.  Please take your steroid taper accordingly:  4 tab(s) orally once a day x 3 days  3 tab(s) orally once a day x 3 days  2 tab(s) orally once a day x 3 days  1 tab orally once a day x 3 days      SECONDARY DISCHARGE DIAGNOSES  Diagnosis: COPD with hypoxia  Assessment and Plan of Treatment: You have a history of COPD. You presented with pneumonia and with a COPD exacerbation. You were treated with antibiotics. We continued your home medications for your COPD and you responded well. You were initially requiring O2 support with 2L nasal canula but you were eventually titrated off. At time of discharge you are saturating well on room air. Please continue to take your inhalation treatments and your prednisone taper as listed below.  4 tab(s) orally once a day x 3 days  3 tab(s) orally once a day x 3 days  2 tab(s) orally once a day x 3 days  1 tab(s) orally once a day x 3 days    Diagnosis: BPH with urinary obstruction  Assessment and Plan of Treatment: Your stay was complicated by urinary retention from your BPH. You required a sinclair catheter but subsequently passed your trial of void. We continued your flomax and added proscar. Please take as prescribed.    Diagnosis: Hypertension  Assessment and Plan of Treatment: You have a history of hypertension. We continued your home medications. Please continue to take as prescribed.    Diagnosis: Bipolar disorder  Assessment and Plan of Treatment: You have a history of bipolar disorder. We continued your home medications. Please continue to take as prescribed.

## 2022-04-07 NOTE — PROGRESS NOTE ADULT - PROBLEM SELECTOR PLAN 6
Pt has history of anxiety and depression   started on home meds of quetiapine, oxcarbazepine, and duloxetine no Pt has h/o HTN  started on home meds of losartan with parameters

## 2022-04-07 NOTE — DISCHARGE NOTE PROVIDER - HOSPITAL COURSE
Pt is a 65 yo M from [facility] with, PMH of HTN, DM, COPD, bipolar disorder, BPH sent to the ED for unresponsiveness. Based on history obtained from EMS patient was watching TV and he suddenly lost consciousness. GCS 3. When he came to the ED, he was back to baseline with GCS 15. No c/o chest pain, shortness of breath, fever, headache, dizziness, N/V, abdominal pain, urinary complaints. Ct scan was negative for acute ischemic or hemorrhagic stroke.    Patient was found to be septic from multifocal pneumonia. He was treated with IV azithromycin and rocephin, and IV fluids. He initially required O2 support with 2LNC, but he was slowly transitioned down to room air. Patient has known orthostatic hypotension for which he takes midodrine. His syncope likely due to combination of sepsis and orthostatic hypotension. Patient was on the telemetry unit which showed bradycardia around ~50. Because of this he had an echocardiogram and stress test. Both which were normal and showed an appropriate increased in HR. At time of discharge, patient is orthostatic negative and stable on room air. Patient will be discharged on 7 more days of ceftin and 5 more days of azithromax.    Patient's stay was complicated by urinary retention 2/2 BPH. He was continued on tamsulosin and we added proscar. Patient subsequently passed his trial of void. Patient was continued on losartan for hypertension,  albuterol, singulair, and spiriva for COPD, and quetiapine, oxcarbazepine, and duloxetine for bipolar.    Given patient's improved clinical status and current hemodynamic stability, decision was made to discharge. Please refer to patient's complete medical chart with documents for a full hospital course, for this is only a brief summary.

## 2022-04-07 NOTE — PROGRESS NOTE ADULT - ATTENDING COMMENTS
HPI:  Pt is a 67 yo M from [facility] with, PMH of HTN, DM, COPD, bipolar disorder, BPH sent to the ED for unresponsiveness. Based on history obtained from EMS patient was watching TV and he suddenly lost consciousness. GCS 3. When he came to the ED, he was back to baseline with GCS 15. No c/o chest pain, shortness of breath, fever, headache, dizziness, N/V, abdominal pain, urinary complaints. (05 Apr 2022 01:23)    # ACUTE ENCEPHALOPATHY VS. SYNCOPE - CURRENTLY AT BASELINE   - MONITOR ON TELEMETRY   - REVIEWED CT HEAD  - ECHO - TRACE MR, NORMAL LV SYSTOLIC FUNCTION, G1DD  - EEG - NORMAL EEG  - ? ORTHOSTATIC BP, F/U ORTHOSTATIC VITALS, MED. REC INCLUDES MIDODRINE ; PATIENT REPORTS HX OF TILT-TABLE TEST  - ? 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
HPI:  Pt is a 67 yo M from [facility] with, PMH of HTN, DM, COPD, bipolar disorder, BPH sent to the ED for unresponsiveness. Based on history obtained from EMS patient was watching TV and he suddenly lost consciousness. GCS 3. When he came to the ED, he was back to baseline with GCS 15. No c/o chest pain, shortness of breath, fever, headache, dizziness, N/V, abdominal pain, urinary complaints. (05 Apr 2022 01:23)    # D/C IN A.M. TO AL    # ACUTE ENCEPHALOPATHY VS. SYNCOPE - CURRENTLY AT BASELINE   - MONITOR ON TELEMETRY   - REVIEWED CT HEAD  - ECHO - TRACE MR, NORMAL LV SYSTOLIC FUNCTION, G1DD  - EEG - NORMAL EEG  - ? ORTHOSTATIC BP, F/U ORTHOSTATIC VITALS, MED. REC INCLUDES MIDODRINE ; PATIENT REPORTS HX OF TILT-TABLE TEST  - ? 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

## 2022-04-07 NOTE — PROGRESS NOTE ADULT - PROBLEM SELECTOR PLAN 5
Pt has history of COPD  Diffuse wheezes heard on exam   Hypoxic on RA at 90s  started on Symbicort inhaler BID  started home meds of albuterol, Singulair and spiriva Pt Utox showed THC and cocaine  Pt counseled

## 2022-04-07 NOTE — DISCHARGE NOTE PROVIDER - CARE PROVIDER_API CALL
Sandor Alvarado)  Medicine  [id number] [place] [address]  Phone: [phone]  Fax: [phone]  Follow Up Time:

## 2022-04-07 NOTE — PROGRESS NOTE ADULT - SUBJECTIVE AND OBJECTIVE BOX
PGY-1 Progress Note discussed with attending    PAGER #: [[phone]] TILL 5:00 PM  PLEASE CONTACT ON CALL TEAM:  - On Call Team (Please refer to Lai) FROM 5:00 PM - 8:30PM  - Nightfloat Team FROM 8:30 -7:30 AM    HPI:  Pt is a 65 yo M from [facility] with, PMH of HTN, DM, COPD, bipolar disorder, BPH sent to the ED for unresponsiveness. Based on history obtained from EMS patient was watching TV and he suddenly lost consciousness. GCS 3. When he came to the ED, he was back to baseline with GCS 15. No c/o chest pain, shortness of breath, fever, headache, dizziness, N/V, abdominal pain, urinary complaints. (05 Apr 2022 01:23)    OVERNIGHT EVENTS:   - No acute events. Patient seen at beside. Patient sitting comfortably in bed on 2L NC saturating >96%. O2 removed.    REVIEW OF SYSTEMS:  CONSTITUTIONAL: No fever, weight loss, or fatigue  RESPIRATORY: +cough (chronic), wheezing, chills or hemoptysis; No shortness of breath  CARDIOVASCULAR: No chest pain, palpitations, dizziness, or leg swelling  GASTROINTESTINAL: No abdominal pain. No nausea, vomiting, or hematemesis; No diarrhea or constipation. No melena or hematochezia.  GENITOURINARY: No dysuria or hematuria, urinary frequency  NEUROLOGICAL: No headaches, memory loss, loss of strength, numbness, or tremors  SKIN: No itching, burning, rashes, or lesions     MEDICATIONS  (STANDING):  aspirin  chewable 81 milliGRAM(s) Oral daily  atorvastatin 20 milliGRAM(s) Oral at bedtime  azithromycin  IVPB 500 milliGRAM(s) IV Intermittent every 24 hours  azithromycin  IVPB      baclofen 10 milliGRAM(s) Oral two times a day  budesonide 160 MICROgram(s)/formoterol 4.5 MICROgram(s) Inhaler 2 Puff(s) Inhalation two times a day  cefTRIAXone   IVPB 1000 milliGRAM(s) IV Intermittent every 24 hours  DULoxetine 30 milliGRAM(s) Oral two times a day  famotidine    Tablet 20 milliGRAM(s) Oral two times a day  finasteride 5 milliGRAM(s) Oral daily  gabapentin 600 milliGRAM(s) Oral three times a day  heparin   Injectable 5000 Unit(s) SubCutaneous every 8 hours  lactated ringers. 1000 milliLiter(s) (100 mL/Hr) IV Continuous <Continuous>  levothyroxine 25 MICROGram(s) Oral daily  LORazepam     Tablet 2 milliGRAM(s) Oral at bedtime  methylPREDNISolone sodium succinate Injectable 40 milliGRAM(s) IV Push every 12 hours  montelukast 10 milliGRAM(s) Oral daily  OXcarbazepine 300 milliGRAM(s) Oral two times a day  oxyCODONE    IR 15 milliGRAM(s) Oral three times a day  polyethylene glycol 3350 17 Gram(s) Oral daily  QUEtiapine 100 milliGRAM(s) Oral at bedtime  senna 2 Tablet(s) Oral at bedtime  tamsulosin 0.4 milliGRAM(s) Oral two times a day    MEDICATIONS  (PRN):  ALBUTerol    90 MICROgram(s) HFA Inhaler 2 Puff(s) Inhalation every 6 hours PRN Shortness of Breath and/or Wheezing      Vital Signs Last 24 Hrs  T(C): 36.4 (07 Apr 2022 07:46), Max: 37 (06 Apr 2022 23:22)  T(F): 97.5 (07 Apr 2022 07:46), Max: 98.6 (06 Apr 2022 23:22)  HR: 60 (07 Apr 2022 07:46) (51 - 60)  BP: 136/60 (07 Apr 2022 07:46) (106/49 - 136/60)  BP(mean): --  RR: 19 (07 Apr 2022 07:46) (17 - 20)  SpO2: 96% (07 Apr 2022 07:46) (96% - 98%)    PHYSICAL EXAMINATION:  GENERAL: NAD, AAOx3  HEAD: AT/NC  EYES: conjunctiva and sclera clear  NECK: supple, No JVD noted, Normal thyroid  CHEST/LUNG: Rhonci b/l improved no wheezing;  HEART: regular rate and rhythm; no murmurs, rubs, or gallops  ABDOMEN: soft, nontender, nondistended; Bowel sounds present  EXTREMITIES:  2+ Peripheral Pulses, No clubbing, cyanosis, or edema  SKIN: warm dry                          12.1   9.98  )-----------( 275      ( 07 Apr 2022 06:56 )             35.3     04-07    137  |  106  |  18  ----------------------------<  115<H>  4.2   |  24  |  0.95    Ca    8.6      07 Apr 2022 06:56  Phos  3.2     04-06  Mg     2.2     04-06                  CAPILLARY BLOOD GLUCOSE          RADIOLOGY & ADDITIONAL TESTS:

## 2022-04-07 NOTE — PROGRESS NOTE ADULT - SUBJECTIVE AND OBJECTIVE BOX
C A R D I O L O G Y  **********************************     DATE OF SERVICE: 04-07-22    Patient denies chest pain or shortness of breath.   Review of symptoms otherwise negative.    ALBUTerol    90 MICROgram(s) HFA Inhaler 2 Puff(s) Inhalation every 6 hours PRN  aspirin  chewable 81 milliGRAM(s) Oral daily  atorvastatin 20 milliGRAM(s) Oral at bedtime  azithromycin  IVPB 500 milliGRAM(s) IV Intermittent every 24 hours  azithromycin  IVPB      baclofen 10 milliGRAM(s) Oral two times a day  budesonide 160 MICROgram(s)/formoterol 4.5 MICROgram(s) Inhaler 2 Puff(s) Inhalation two times a day  cefTRIAXone   IVPB 1000 milliGRAM(s) IV Intermittent every 24 hours  DULoxetine 30 milliGRAM(s) Oral two times a day  famotidine    Tablet 20 milliGRAM(s) Oral two times a day  finasteride 5 milliGRAM(s) Oral daily  gabapentin 600 milliGRAM(s) Oral three times a day  heparin   Injectable 5000 Unit(s) SubCutaneous every 8 hours  lactated ringers. 1000 milliLiter(s) IV Continuous <Continuous>  levothyroxine 25 MICROGram(s) Oral daily  LORazepam     Tablet 2 milliGRAM(s) Oral at bedtime  methylPREDNISolone sodium succinate Injectable 40 milliGRAM(s) IV Push every 12 hours  montelukast 10 milliGRAM(s) Oral daily  OXcarbazepine 300 milliGRAM(s) Oral two times a day  oxyCODONE    IR 15 milliGRAM(s) Oral three times a day  polyethylene glycol 3350 17 Gram(s) Oral daily  QUEtiapine 100 milliGRAM(s) Oral at bedtime  senna 2 Tablet(s) Oral at bedtime  tamsulosin 0.4 milliGRAM(s) Oral two times a day                            12.1   9.98  )-----------( 275      ( 07 Apr 2022 06:56 )             35.3       Hemoglobin: 12.1 g/dL (04-07 @ 06:56)  Hemoglobin: 12.0 g/dL (04-06 @ 07:24)  Hemoglobin: 11.4 g/dL (04-05 @ 05:53)  Hemoglobin: 11.0 g/dL (04-04 @ 22:12)      04-07    137  |  106  |  18  ----------------------------<  115<H>  4.2   |  24  |  0.95    Ca    8.6      07 Apr 2022 06:56  Phos  3.2     04-06  Mg     2.2     04-06      Creatinine Trend: 0.95<--, 0.90<--, 1.24<--, 1.38<--    COAGS:           T(C): 36.4 (04-07-22 @ 07:46), Max: 37 (04-06-22 @ 23:22)  HR: 60 (04-07-22 @ 07:46) (51 - 60)  BP: 136/60 (04-07-22 @ 07:46) (113/63 - 136/60)  RR: 19 (04-07-22 @ 07:46) (17 - 20)  SpO2: 96% (04-07-22 @ 07:46) (96% - 97%)  Wt(kg): --    I&O's Summary    06 Apr 2022 07:01  -  07 Apr 2022 07:00  --------------------------------------------------------  IN: 0 mL / OUT: 1050 mL / NET: -1050 mL    07 Apr 2022 07:01  -  07 Apr 2022 12:23  --------------------------------------------------------  IN: 0 mL / OUT: 900 mL / NET: -900 mL            Gen: Appears well in NAD  HEENT:  (-)icterus (-)pallor  CV: N S1 S2 1/6 DAVID (+)2 Pulses B/l  Resp:  scattered ronchi B/L, normal effort  GI: (+) BS Soft, NT, ND  Lymph:  (-)Edema, (-)obvious lymphadenopathy  Skin: Warm to touch, Normal turgor  Psych: Appropriate mood and affect      TELEMETRY: 	  sinus         ASSESSMENT/PLAN: 	66y  Male [facility] with, PMH of HTN, DM, COPD, bipolar disorder, BPH sent to the ED for unresponsiveness.    - Multifocal PNA, abx per Medical team  - Echo with normal LV function  - Pharmacologic stress with normal perfusion.  Patient attempted to walk on the treadmill and his HR appropriately increased.  No signs of chronotropic incompetence  - Can D/C tele     Elmer Rapp MD, [facility]  BEEPER [phone]

## 2022-04-07 NOTE — PROGRESS NOTE ADULT - PROBLEM SELECTOR PLAN 7
on heparin sq for DVT ppx  on PPI for GI ppx Pt has history of COPD  Diffuse wheezes heard on exam   Hypoxic on RA at 90s  started on Symbicort inhaler BID  started home meds of albuterol, Singulair and spiriva

## 2022-04-07 NOTE — DISCHARGE NOTE PROVIDER - NSDCMRMEDTOKEN_GEN_ALL_CORE_FT
albuterol 90 mcg/inh inhalation powder: 2 puff(s) inhaled every 6 hours, As Needed - for shortness of breath and/or wheezing  aspirin 81 mg oral tablet, chewable: 1 tab(s) orally once a day  atorvastatin 20 mg oral tablet: 1 tab(s) orally once a day  azithromycin 500 mg oral tablet: 1 tab(s) orally once a day  baclofen 10 mg oral tablet: 1 tab(s) orally 2 times a day  cefuroxime 250 mg oral tablet: 1 tab(s) orally 2 times a day   DULoxetine 30 mg oral delayed release capsule: 1 cap(s) orally 2 times a day  DuoNeb 0.5 mg-2.5 mg/3 mL inhalation solution: 3 milliliter(s) inhaled 4 times a day, As Needed  finasteride 5 mg oral tablet: 1 tab(s) orally once a day  gabapentin 300 mg oral capsule: 2 cap(s) orally 3 times a day  levothyroxine 25 mcg (0.025 mg) oral tablet: 1 tab(s) orally once a day  LORazepam 2 mg oral tablet: 1 tab(s) orally once a day (at bedtime)  magnesium citrate 1.745 g/30 mL oral liquid: 30  orally once a day, As Needed  Milk of Magnesia 8% oral suspension: 15 milliliter(s) orally once a day (at bedtime), As Needed  nicotine 2 mg oral transmucosal gum: 1 gum oral transmucosal every 2 hours  OXcarbazepine 300 mg oral tablet: 1 tab(s) orally 2 times a day  oxyCODONE 5 mg oral tablet: 3 tab(s) orally 3 times a day  Pepcid 20 mg oral tablet: 1 tab(s) orally 2 times a day  predniSONE 10 mg oral tablet: 4 tab(s) orally once a day x 3 days  3 tab(s) orally once a day x 3 days  2 tab(s) orally once a day x 3 days  1 tab(s) orally once a day x 3 days  QUEtiapine 100 mg oral tablet: 1 tab(s) orally once a day (at bedtime)  Senna 8.6 mg oral tablet: 1 tab(s) orally 2 times a day  Singulair 10 mg oral tablet: 1 tab(s) orally once a day  tamsulosin 0.4 mg oral capsule: 1 cap(s) orally once a day  Trelegy Ellipta 100 mcg-62.5 mcg-25 mcg/inh inhalation powder: 1 puff(s) inhaled once a day  Vitamin D2 50,000 intl units (1.25 mg) oral capsule: 1 cap(s) orally once a month

## 2022-04-08 ENCOUNTER — TRANSCRIPTION ENCOUNTER (OUTPATIENT)
Age: 67
End: 2022-04-08

## 2022-04-08 VITALS
OXYGEN SATURATION: 95 % | SYSTOLIC BLOOD PRESSURE: 129 MMHG | RESPIRATION RATE: 17 BRPM | TEMPERATURE: 98 F | DIASTOLIC BLOOD PRESSURE: 74 MMHG | HEART RATE: 58 BPM

## 2022-04-08 DIAGNOSIS — F19.10 OTHER PSYCHOACTIVE SUBSTANCE ABUSE, UNCOMPLICATED: ICD-10-CM

## 2022-04-08 DIAGNOSIS — R00.1 BRADYCARDIA, UNSPECIFIED: ICD-10-CM

## 2022-04-08 RX ORDER — SODIUM CHLORIDE 9 MG/ML
500 INJECTION INTRAMUSCULAR; INTRAVENOUS; SUBCUTANEOUS ONCE
Refills: 0 | Status: COMPLETED | OUTPATIENT
Start: 2022-04-08 | End: 2022-04-08

## 2022-04-08 RX ADMIN — OXYCODONE HYDROCHLORIDE 15 MILLIGRAM(S): 5 TABLET ORAL at 14:33

## 2022-04-08 RX ADMIN — OXYCODONE HYDROCHLORIDE 15 MILLIGRAM(S): 5 TABLET ORAL at 06:53

## 2022-04-08 RX ADMIN — MONTELUKAST 10 MILLIGRAM(S): 4 TABLET, CHEWABLE ORAL at 11:30

## 2022-04-08 RX ADMIN — OXYCODONE HYDROCHLORIDE 15 MILLIGRAM(S): 5 TABLET ORAL at 13:50

## 2022-04-08 RX ADMIN — OXCARBAZEPINE 300 MILLIGRAM(S): 300 TABLET, FILM COATED ORAL at 06:00

## 2022-04-08 RX ADMIN — OXYCODONE HYDROCHLORIDE 15 MILLIGRAM(S): 5 TABLET ORAL at 06:03

## 2022-04-08 RX ADMIN — FAMOTIDINE 20 MILLIGRAM(S): 10 INJECTION INTRAVENOUS at 06:00

## 2022-04-08 RX ADMIN — Medication 40 MILLIGRAM(S): at 06:01

## 2022-04-08 RX ADMIN — SODIUM CHLORIDE 500 MILLILITER(S): 9 INJECTION INTRAMUSCULAR; INTRAVENOUS; SUBCUTANEOUS at 14:52

## 2022-04-08 RX ADMIN — TAMSULOSIN HYDROCHLORIDE 0.4 MILLIGRAM(S): 0.4 CAPSULE ORAL at 06:01

## 2022-04-08 RX ADMIN — HEPARIN SODIUM 5000 UNIT(S): 5000 INJECTION INTRAVENOUS; SUBCUTANEOUS at 13:52

## 2022-04-08 RX ADMIN — GABAPENTIN 600 MILLIGRAM(S): 400 CAPSULE ORAL at 13:51

## 2022-04-08 RX ADMIN — DULOXETINE HYDROCHLORIDE 30 MILLIGRAM(S): 30 CAPSULE, DELAYED RELEASE ORAL at 06:01

## 2022-04-08 RX ADMIN — GABAPENTIN 600 MILLIGRAM(S): 400 CAPSULE ORAL at 06:03

## 2022-04-08 RX ADMIN — Medication 81 MILLIGRAM(S): at 11:30

## 2022-04-08 RX ADMIN — Medication 25 MICROGRAM(S): at 06:00

## 2022-04-08 RX ADMIN — FINASTERIDE 5 MILLIGRAM(S): 5 TABLET, FILM COATED ORAL at 11:30

## 2022-04-08 RX ADMIN — HEPARIN SODIUM 5000 UNIT(S): 5000 INJECTION INTRAVENOUS; SUBCUTANEOUS at 05:59

## 2022-04-08 RX ADMIN — Medication 10 MILLIGRAM(S): at 05:59

## 2022-04-08 NOTE — PROGRESS NOTE ADULT - PROBLEM SELECTOR PLAN 3
Admitted to telemetry, shows bradycardia to the 40s  Echo and stress test normal  Pt had appropriate increase in HR on treadmill

## 2022-04-08 NOTE — PROGRESS NOTE ADULT - SUBJECTIVE AND OBJECTIVE BOX
PGY-1 Progress Note discussed with attending    PAGER #: [[phone]] TILL 5:00 PM  PLEASE CONTACT ON CALL TEAM:  - On Call Team (Please refer to Lai) FROM 5:00 PM - 8:30PM  - Nightfloat Team FROM 8:30 -7:30 AM    HPI:  Pt is a 67 yo M from [place] with, PMH of HTN, DM, COPD, bipolar disorder, BPH sent to the ED for unresponsiveness. Based on history obtained from EMS patient was watching TV and he suddenly lost consciousness. GCS 3. When he came to the ED, he was back to baseline with GCS 15. No c/o chest pain, shortness of breath, fever, headache, dizziness, N/V, abdominal pain, urinary complaints. (05 Apr 2022 01:23)    OVERNIGHT EVENTS:   - No acute events. Patient for discharge today    REVIEW OF SYSTEMS:  CONSTITUTIONAL: No fever, weight loss, or fatigue  RESPIRATORY: +cough, wheezing, chills or hemoptysis; No shortness of breath  CARDIOVASCULAR: No chest pain, palpitations, dizziness, or leg swelling  GASTROINTESTINAL: No abdominal pain. No nausea, vomiting, or hematemesis; No diarrhea or constipation. No melena or hematochezia.  GENITOURINARY: No dysuria or hematuria, urinary frequency  NEUROLOGICAL: No headaches, memory loss, loss of strength, numbness, or tremors  SKIN: No itching, burning, rashes, or lesions     MEDICATIONS  (STANDING):  aspirin  chewable 81 milliGRAM(s) Oral daily  atorvastatin 20 milliGRAM(s) Oral at bedtime  azithromycin  IVPB 500 milliGRAM(s) IV Intermittent every 24 hours  azithromycin  IVPB      baclofen 10 milliGRAM(s) Oral two times a day  budesonide 160 MICROgram(s)/formoterol 4.5 MICROgram(s) Inhaler 2 Puff(s) Inhalation two times a day  cefTRIAXone   IVPB 1000 milliGRAM(s) IV Intermittent every 24 hours  DULoxetine 30 milliGRAM(s) Oral two times a day  famotidine    Tablet 20 milliGRAM(s) Oral two times a day  finasteride 5 milliGRAM(s) Oral daily  gabapentin 600 milliGRAM(s) Oral three times a day  heparin   Injectable 5000 Unit(s) SubCutaneous every 8 hours  lactated ringers. 1000 milliLiter(s) (100 mL/Hr) IV Continuous <Continuous>  levothyroxine 25 MICROGram(s) Oral daily  LORazepam     Tablet 2 milliGRAM(s) Oral at bedtime  methylPREDNISolone sodium succinate Injectable 40 milliGRAM(s) IV Push every 12 hours  montelukast 10 milliGRAM(s) Oral daily  OXcarbazepine 300 milliGRAM(s) Oral two times a day  oxyCODONE    IR 15 milliGRAM(s) Oral three times a day  polyethylene glycol 3350 17 Gram(s) Oral daily  QUEtiapine 100 milliGRAM(s) Oral at bedtime  senna 2 Tablet(s) Oral at bedtime  tamsulosin 0.4 milliGRAM(s) Oral two times a day    MEDICATIONS  (PRN):  ALBUTerol    90 MICROgram(s) HFA Inhaler 2 Puff(s) Inhalation every 6 hours PRN Shortness of Breath and/or Wheezing      Vital Signs Last 24 Hrs  T(C): 36.7 (08 Apr 2022 05:00), Max: 37.1 (07 Apr 2022 20:07)  T(F): 98 (08 Apr 2022 05:00), Max: 98.7 (07 Apr 2022 20:07)  HR: 61 (08 Apr 2022 05:00) (57 - 72)  BP: 121/63 (08 Apr 2022 05:00) (114/63 - 136/60)  BP(mean): --  RR: 18 (08 Apr 2022 05:00) (18 - 20)  SpO2: 95% (08 Apr 2022 05:00) (93% - 96%)    PHYSICAL EXAMINATION:  GENERAL: NAD, AAOx3, patient laying comfortably in bed, remains off O2  HEAD: AT/NC  EYES: conjunctiva and sclera clear  NECK: supple, No JVD noted, Normal thyroid  CHEST/LUNG: b/l rhonci  HEART: regular rate and rhythm; no murmurs, rubs, or gallops  ABDOMEN: soft, nontender, nondistended; Bowel sounds present  EXTREMITIES:  2+ Peripheral Pulses, No clubbing, cyanosis, or edema  SKIN: warm dry                          12.1   9.98  )-----------( 275      ( 07 Apr 2022 06:56 )             35.3     04-07    137  |  106  |  18  ----------------------------<  115<H>  4.2   |  24  |  0.95    Ca    8.6      07 Apr 2022 06:56  Phos  3.2     04-06  Mg     2.2     04-06                  CAPILLARY BLOOD GLUCOSE          RADIOLOGY & ADDITIONAL TESTS:    < from: TTE with Doppler (w/Cont) (04.06.22 @ 14:26) >  ------------------------------------------------------------------------  CONCLUSIONS:  1. Trace mitral regurgitation.  2. Normal left ventricular internal dimensions and wall  thicknesses.  3. Endocardium not well visualized; grossly normal left  ventricular systolic function.   Segmental wall motion  could not be assessed. Ultrasound LV opacification agent  (Definity) was used to enhance endocardial definition.  4. Grade I diastolic dysfunction (Impaired relaxation,  mild).  5. Normal right ventricular size and function.    ------------------------------------------------------------------------  Confirmed on  4/6/2022 - 12:03:32 by Elmer Rapp MD  ------------------------------------------------------------------------    < end of copied text >      < from: Nuclear Stress Test-Pharmacologic (04.07.22 @ 10:14) >  * Negative ECG evidence of ischemia after IV  administartion of Regadenoson.  * Myocardial Perfusion SPECT results are normal.  * No clear evidence of ischemia or infarct.  * Post-stress resting myocardial perfusion gated SPECT  imaging was performed (LVEF > 70%) with no regional wall  motion abnormalities.  * The patient walked on the treadmill for 2 minutes and  his heart rate appropriately increased to 110 BPM.  ------------------------------------------------------------------------      ------------------------------------------------------------------------    Confirmed on  4/7/2022 - 12:21:23 at [place] by  Elmer Rapp MD  ------------------------------------------------------------------------    < end of copied text >

## 2022-04-08 NOTE — PHARMACOTHERAPY INTERVENTION NOTE - COMMENTS
STAR LIST PT WITH PNA, MEDICATION PROFILE REVIEWED
IV Azithromycin and ceftriaxone for PNA, day #4, recommended PO switch as pt qualifies; as per covering resident antibiotics will be stopped as pt prepared for dsc

## 2022-04-08 NOTE — PROGRESS NOTE ADULT - PROBLEM SELECTOR PLAN 1
Pt was sent from NH for unresponsiveness, GCS score of 3  In ED, GCS improved to 15  Vitals 89/51 mmHg, HR 60s, afebrile, 90% on RA  EKG showed sinus bradycardia of 53  Blood glucose 128  Electrolytes wnl  CT head showed No acute intracranial CT abnormality.  Patient likely has baseline orthostatics given that he's on midodrine though orthostatic negative here (after fluids)  Cardiology Dr. Rapp consulted, not likely cardiac in origin. Patient bradycardic but has appropriate increase in HR on treadmill, echo and stress normal  Utox showed THC + cocaine, patient endorses taking edible marijuana before episode  Likely 2/2 multifocal PNA vs 2/2 drug use, patient counseled

## 2022-04-08 NOTE — PROGRESS NOTE ADULT - PROBLEM SELECTOR PLAN 2
CT chest showed multifocal pneumonia, patient with cough  On 2L NC sating 98%  c/w azithromycinm rocephin  c/w IVF  Pt of O2

## 2022-04-08 NOTE — PROGRESS NOTE ADULT - REASON FOR ADMISSION
Unresponsiveness

## 2022-04-08 NOTE — DISCHARGE NOTE NURSING/CASE MANAGEMENT/SOCIAL WORK - PATIENT PORTAL LINK FT
You can access the FollowMyHealth Patient Portal offered by [facility] by registering at the following website: [email or web address]. By joining [employer]’s FollowMyHealth portal, you will also be able to view your health information using other applications (apps) compatible with our system.

## 2022-04-08 NOTE — DISCHARGE NOTE NURSING/CASE MANAGEMENT/SOCIAL WORK - NSDCPEFALRISK_GEN_ALL_CORE
For information on Fall & Injury Prevention, visit: [email or web address] OR  [email or web address] OR  https://www.cdc.gov/steadi/patient.html

## 2022-04-08 NOTE — DISCHARGE NOTE NURSING/CASE MANAGEMENT/SOCIAL WORK - NSDCPEWEB_GEN_ALL_CORE
[facility] for Tobacco Control website --- [email or web address] website --- [email or web address]

## 2022-04-08 NOTE — PROGRESS NOTE ADULT - SUBJECTIVE AND OBJECTIVE BOX
C A R D I O L O G Y  **********************************     DATE OF SERVICE: 04-08-22    Patient denies chest pain or shortness of breath.   Review of symptoms otherwise negative.    ALBUTerol    90 MICROgram(s) HFA Inhaler 2 Puff(s) Inhalation every 6 hours PRN  aspirin  chewable 81 milliGRAM(s) Oral daily  atorvastatin 20 milliGRAM(s) Oral at bedtime  azithromycin  IVPB 500 milliGRAM(s) IV Intermittent every 24 hours  azithromycin  IVPB      baclofen 10 milliGRAM(s) Oral two times a day  budesonide 160 MICROgram(s)/formoterol 4.5 MICROgram(s) Inhaler 2 Puff(s) Inhalation two times a day  cefTRIAXone   IVPB 1000 milliGRAM(s) IV Intermittent every 24 hours  DULoxetine 30 milliGRAM(s) Oral two times a day  famotidine    Tablet 20 milliGRAM(s) Oral two times a day  finasteride 5 milliGRAM(s) Oral daily  gabapentin 600 milliGRAM(s) Oral three times a day  heparin   Injectable 5000 Unit(s) SubCutaneous every 8 hours  lactated ringers. 1000 milliLiter(s) IV Continuous <Continuous>  levothyroxine 25 MICROGram(s) Oral daily  LORazepam     Tablet 2 milliGRAM(s) Oral at bedtime  methylPREDNISolone sodium succinate Injectable 40 milliGRAM(s) IV Push every 12 hours  montelukast 10 milliGRAM(s) Oral daily  OXcarbazepine 300 milliGRAM(s) Oral two times a day  oxyCODONE    IR 15 milliGRAM(s) Oral three times a day  polyethylene glycol 3350 17 Gram(s) Oral daily  QUEtiapine 100 milliGRAM(s) Oral at bedtime  senna 2 Tablet(s) Oral at bedtime  tamsulosin 0.4 milliGRAM(s) Oral two times a day                            12.1   9.98  )-----------( 275      ( 07 Apr 2022 06:56 )             35.3       Hemoglobin: 12.1 g/dL (04-07 @ 06:56)  Hemoglobin: 12.0 g/dL (04-06 @ 07:24)  Hemoglobin: 11.4 g/dL (04-05 @ 05:53)  Hemoglobin: 11.0 g/dL (04-04 @ 22:12)      04-07    137  |  106  |  18  ----------------------------<  115<H>  4.2   |  24  |  0.95    Ca    8.6      07 Apr 2022 06:56      Creatinine Trend: 0.95<--, 0.90<--, 1.24<--, 1.38<--    COAGS:           T(C): 36.4 (04-08-22 @ 11:03), Max: 37.1 (04-07-22 @ 20:07)  HR: 53 (04-08-22 @ 11:03) (53 - 72)  BP: 133/66 (04-08-22 @ 11:03) (114/63 - 133/66)  RR: 18 (04-08-22 @ 11:03) (18 - 20)  SpO2: 94% (04-08-22 @ 11:03) (93% - 96%)  Wt(kg): --    I&O's Summary    07 Apr 2022 07:01  -  08 Apr 2022 07:00  --------------------------------------------------------  IN: 0 mL / OUT: 1100 mL / NET: -1100 mL        Gen: Appears well in NAD  HEENT:  (-)icterus (-)pallor  CV: N S1 S2 1/6 DAVID (+)2 Pulses B/l  Resp:  scattered ronchi B/L, normal effort  GI: (+) BS Soft, NT, ND  Lymph:  (-)Edema, (-)obvious lymphadenopathy  Skin: Warm to touch, Normal turgor  Psych: Appropriate mood and affect      TELEMETRY: 	 off        ASSESSMENT/PLAN: 	66y  Male [facility] with, PMH of HTN, DM, COPD, bipolar disorder, BPH sent to the ED for unresponsiveness.    - Multifocal PNA, abx per Medical team  - Echo with normal LV function  - Pharmacologic stress with normal perfusion.  Patient attempted to walk on the treadmill and his HR appropriately increased.  No signs of chronotropic incompetence  - No need for further inpatient cardiac work up.  - Oupt cardiac f/u [phone]    Elmer Rapp MD, FACC  BEEPER [phone]

## 2022-04-08 NOTE — PROGRESS NOTE ADULT - PROBLEM SELECTOR PLAN 8
Pt has history of anxiety and depression   started on home meds of quetiapine, oxcarbazepine, and duloxetine
Pt has history of anxiety and depression   started on home meds of quetiapine, oxcarbazepine, and duloxetine

## 2022-04-08 NOTE — PROGRESS NOTE ADULT - PROVIDER SPECIALTY LIST ADULT
Cardiology
Internal Medicine
Neurology
Internal Medicine

## 2022-04-08 NOTE — DISCHARGE NOTE NURSING/CASE MANAGEMENT/SOCIAL WORK - NSDCPEEMAIL_GEN_ALL_CORE
[facility] for Tobacco Control email [email or web address]

## 2022-04-08 NOTE — PROGRESS NOTE ADULT - PROBLEM SELECTOR PLAN 4
On admission pts bladder scan showed more than 1000cc  Pt has history of BPH  Coude sinclair cathter placed in ED, drainwd 1200cc  monitor urine output  Continued home flomax and started on proscar   Bowel reg  Passed TOV 4/7

## 2022-04-10 LAB
CULTURE RESULTS: SIGNIFICANT CHANGE UP
CULTURE RESULTS: SIGNIFICANT CHANGE UP
GLUCOSE BLDC GLUCOMTR-MCNC: 113 MG/DL — HIGH (ref 70–99)
SPECIMEN SOURCE: SIGNIFICANT CHANGE UP
SPECIMEN SOURCE: SIGNIFICANT CHANGE UP

## 2022-04-15 LAB — METHYLMALONATE SERPL-SCNC: 201 NMOL/L — SIGNIFICANT CHANGE UP (ref 0–378)

## 2022-04-26 PROCEDURE — 83690 ASSAY OF LIPASE: CPT

## 2022-04-26 PROCEDURE — 80048 BASIC METABOLIC PNL TOTAL CA: CPT

## 2022-04-26 PROCEDURE — 99285 EMERGENCY DEPT VISIT HI MDM: CPT | Mod: 25

## 2022-04-26 PROCEDURE — 97116 GAIT TRAINING THERAPY: CPT

## 2022-04-26 PROCEDURE — 85730 THROMBOPLASTIN TIME PARTIAL: CPT

## 2022-04-26 PROCEDURE — 87086 URINE CULTURE/COLONY COUNT: CPT

## 2022-04-26 PROCEDURE — 83880 ASSAY OF NATRIURETIC PEPTIDE: CPT

## 2022-04-26 PROCEDURE — 95957 EEG DIGITAL ANALYSIS: CPT

## 2022-04-26 PROCEDURE — 85610 PROTHROMBIN TIME: CPT

## 2022-04-26 PROCEDURE — 83036 HEMOGLOBIN GLYCOSYLATED A1C: CPT

## 2022-04-26 PROCEDURE — 86901 BLOOD TYPING SEROLOGIC RH(D): CPT

## 2022-04-26 PROCEDURE — 80053 COMPREHEN METABOLIC PANEL: CPT

## 2022-04-26 PROCEDURE — 70450 CT HEAD/BRAIN W/O DYE: CPT | Mod: MA

## 2022-04-26 PROCEDURE — 87637 SARSCOV2&INF A&B&RSV AMP PRB: CPT

## 2022-04-26 PROCEDURE — 83921 ORGANIC ACID SINGLE QUANT: CPT

## 2022-04-26 PROCEDURE — 80307 DRUG TEST PRSMV CHEM ANLYZR: CPT

## 2022-04-26 PROCEDURE — 84484 ASSAY OF TROPONIN QUANT: CPT

## 2022-04-26 PROCEDURE — 97161 PT EVAL LOW COMPLEX 20 MIN: CPT

## 2022-04-26 PROCEDURE — 78452 HT MUSCLE IMAGE SPECT MULT: CPT

## 2022-04-26 PROCEDURE — 80061 LIPID PANEL: CPT

## 2022-04-26 PROCEDURE — 82962 GLUCOSE BLOOD TEST: CPT

## 2022-04-26 PROCEDURE — 84100 ASSAY OF PHOSPHORUS: CPT

## 2022-04-26 PROCEDURE — 81003 URINALYSIS AUTO W/O SCOPE: CPT

## 2022-04-26 PROCEDURE — 84132 ASSAY OF SERUM POTASSIUM: CPT

## 2022-04-26 PROCEDURE — 71250 CT THORAX DX C-: CPT

## 2022-04-26 PROCEDURE — A9502: CPT

## 2022-04-26 PROCEDURE — 95819 EEG AWAKE AND ASLEEP: CPT

## 2022-04-26 PROCEDURE — 94640 AIRWAY INHALATION TREATMENT: CPT

## 2022-04-26 PROCEDURE — 71045 X-RAY EXAM CHEST 1 VIEW: CPT

## 2022-04-26 PROCEDURE — 87040 BLOOD CULTURE FOR BACTERIA: CPT

## 2022-04-26 PROCEDURE — 86900 BLOOD TYPING SEROLOGIC ABO: CPT

## 2022-04-26 PROCEDURE — 85027 COMPLETE CBC AUTOMATED: CPT

## 2022-04-26 PROCEDURE — 84443 ASSAY THYROID STIM HORMONE: CPT

## 2022-04-26 PROCEDURE — 86850 RBC ANTIBODY SCREEN: CPT

## 2022-04-26 PROCEDURE — 93017 CV STRESS TEST TRACING ONLY: CPT

## 2022-04-26 PROCEDURE — 96374 THER/PROPH/DIAG INJ IV PUSH: CPT

## 2022-04-26 PROCEDURE — 93005 ELECTROCARDIOGRAM TRACING: CPT

## 2022-04-26 PROCEDURE — 74176 CT ABD & PELVIS W/O CONTRAST: CPT

## 2022-04-26 PROCEDURE — 83090 ASSAY OF HOMOCYSTEINE: CPT

## 2022-04-26 PROCEDURE — 36415 COLL VENOUS BLD VENIPUNCTURE: CPT

## 2022-04-26 PROCEDURE — 82746 ASSAY OF FOLIC ACID SERUM: CPT

## 2022-04-26 PROCEDURE — 93306 TTE W/DOPPLER COMPLETE: CPT

## 2022-04-26 PROCEDURE — 82803 BLOOD GASES ANY COMBINATION: CPT

## 2022-04-26 PROCEDURE — 83735 ASSAY OF MAGNESIUM: CPT

## 2022-04-26 PROCEDURE — 85025 COMPLETE CBC W/AUTO DIFF WBC: CPT

## 2022-04-26 PROCEDURE — 83605 ASSAY OF LACTIC ACID: CPT

## 2022-04-26 PROCEDURE — 82607 VITAMIN B-12: CPT

## 2023-04-26 NOTE — H&P ADULT - PROBLEM SELECTOR PROBLEM 3
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  Plan: inject Vabysmo OU 

RTC 1 month ND Vabysmo OU Anxiety and depression Hypertension

## 2023-12-07 NOTE — ED ADULT TRIAGE NOTE - BSA (M2)
Problem: Mechanical Ventilation Invasive  Goal: Effective Communication  Outcome: Progressing  Goal: Mechanical Ventilation Liberation  Outcome: Progressing  Goal: Absence of Device-Related Skin and Tissue Injury  Outcome: Progressing  Goal: Absence of Ventilator-Induced Lung Injury  Outcome: Progressing   2.15

## 2025-03-26 NOTE — ED ADULT NURSE NOTE - NS ED NURSE LEVEL OF CONSCIOUSNESS MENTAL STATUS
Pt received from primary RN stable. Respirations even and unlabored. Pt expresses no discomfort at this time. Medications given as per current care plan. TBA, awaiting bed assignment. Safety precautions in place.
Drowsy